# Patient Record
Sex: FEMALE | Race: WHITE | NOT HISPANIC OR LATINO | URBAN - METROPOLITAN AREA
[De-identification: names, ages, dates, MRNs, and addresses within clinical notes are randomized per-mention and may not be internally consistent; named-entity substitution may affect disease eponyms.]

---

## 2021-03-08 NOTE — PROGRESS NOTES
SUBJECTIVE:   CC: {Ivy Arriaga is an {30 year old woman who presents for preventive health visit. She is a new patient to our clinic and has been receiving health care at [facility].  Her last CPE was several years ago.. She has a history of intrinsic eczema. She has the following concerns she would like addressed today:    Long standing history of eczema: She primarily has outbreaks on her shins.  Rash has been present her whole life and father has similar.  She occasionally gets outbreaks on other parts of her body.  She has seen dermatology in the past and tried many steroid creams which may make it better for a short period of time.  Today she is wondering if it could be related to a food allergy specificly gluten.  Her symptoms do seem to be better when she sticks with a gluten-free diet.   Has improved with age.       GYN history:  Menarche: 12  Periods: regular, lasting 5-6days.  Flow described as heavy initially for 2 days.  Cycles are 35-40 days.  yes dysmenorrhea, yes Dysparuneia  Currently sexually active: yes    Contraception: Condoms--would really like to avoid hormonal birth control methods.  {Patient's last menstrual period was 2021.  Vaginal symptoms: No  Concern for STD: No    Accepts/requests STD testing: declined  History of abnormal Pap smear: NO - age 21-29 PAP every 3 years recommended    Health maintenance:  Mammogram: NA  Colonoscopy: NA  PAP: No results found for: PAP    Immunizations: Unable to look up patient in back due to name change with marriage in the last year.  Chart merge requested.    Immunization records are not available for review.  She is currently weight awaiting her second Covid vaccine.  Encouraged her to send full vaccine records when able we will review.  Suspect she may be due for a tetanus shot.    Healthy Habits:    Do you get at least three servings of calcium containing foods daily (dairy, green leafy vegetables, etc.)? yes    Amount of exercise  or daily activities, outside of work: 5-6 day(s) per week    Problems taking medications regularly not applicable    Medication side effects: No    Have you had an eye exam in the past two years? yes    Do you see a dentist twice per year? yes    Do you have sleep apnea, excessive snoring or daytime drowsiness?no    PHQ-2 Score:     PHQ-2 ( 1999 Pfizer) 3/9/2021   Q1: Little interest or pleasure in doing things 0   Q2: Feeling down, depressed or hopeless 0   PHQ-2 Score 0          Patient Active Problem List    Diagnosis Date Noted     Menorrhagia with regular cycle 03/19/2021     Priority: Medium     Intrinsic eczema 03/19/2021     Priority: Medium     Vitamin D deficiency 03/19/2021     Priority: Medium     Rapid resting heart rate 05/13/2019     Priority: Medium     HECTOR (generalized anxiety disorder) 12/11/2017     Priority: Medium       Past Medical History:   Diagnosis Date     Intrinsic eczema        Past Surgical History:   Procedure Laterality Date     wisdom teeth         Family History   Problem Relation Age of Onset     Diabetes Type 1 Father      Hypertension Maternal Grandmother      Parkinsonism Paternal Grandmother        Social History     Tobacco Use     Smoking status: Never Smoker     Smokeless tobacco: Never Used   Substance Use Topics     Alcohol use: Yes     Alcohol/week: 5.0 standard drinks     Types: 5 Standard drinks or equivalent per week     Frequency: 2-3 times a week     Drinks per session: 1 or 2     Binge frequency: Never       Social History     Social History Narrative    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  Grew up in [place].    Recently  2019.        Has a good support system.    Feels safe in all environments.    Wears seatbelt 100% of the time    Wears helmet while biking.    Denies history of abuse, past or present, physical, sexual or emotional.     Marsha Emery PA-C    03/09/21           No current outpatient medications on file.                          Reviewed  "orders with patient.  Reviewed health maintenance and updated orders accordingly - Yes    ROS:  CONSTITUTIONAL: NEGATIVE for fever, chills, change in weight  INTEGUMENTARU/SKIN: NEGATIVE for worrisome rashes, moles or lesions  EYES: NEGATIVE for vision changes or irritation  ENT: NEGATIVE for ear, mouth and throat problems  RESP: NEGATIVE for significant cough or SOB  BREAST: NEGATIVE for masses, tenderness or discharge  CV: NEGATIVE for chest pain, palpitations or peripheral edema  GI: NEGATIVE for nausea, abdominal pain, heartburn, or change in bowel habits  : NEGATIVE for unusual urinary or vaginal symptoms. Periods are regular.  MUSCULOSKELETAL: NEGATIVE for significant arthralgias or myalgia  NEURO: NEGATIVE for weakness, dizziness or paresthesias  ENDOCRINE: NEGATIVE for temperature intolerance, skin/hair changes  HEME/ALLERGY/IMMUNE: NEGATIVE for bleeding problems  PSYCHIATRIC: NEGATIVE for changes in mood or affect    OBJECTIVE:   /84   Pulse 106   Temp 97  F (36.1  C) (Skin)   Resp 15   Ht 1.644 m (5' 4.72\")   Wt 64.3 kg (141 lb 12 oz)   LMP 02/11/2021   SpO2 100%   BMI 23.79 kg/m      EXAM:  GENERAL: healthy, alert and no distress  EYES: Eyes grossly normal to inspection, PERRL and conjunctivae and sclerae normal  HENT: ear canals and TM's normal, nose and mouth without ulcers or lesions  NECK: no adenopathy, no asymmetry, masses, or scars and thyroid normal to palpation.  No bruits  RESP: lungs clear to auscultation - no rales, rhonchi or wheezes  BREAST: normal without masses, tenderness or nipple discharge and no palpable axillary masses or adenopathy  CV: regular rate and rhythm, normal S1 S2, no S3 or S4, no murmur, click or rub, no peripheral edema and peripheral pulses strong  ABDOMEN: soft, nontender, no hepatosplenomegaly, no masses and bowel sounds normal   (female): normal female external genitalia, normal urethral meatus, vaginal mucosa pink, moist, well rugated, and normal " cervix/adnexa/uterus without masses or discharge  MS: no gross musculoskeletal defects noted, no clubbing, edema or cyanosis of extremities.  Pulses = and appropriate bilaterally to DP and PT  SKIN: no suspicious lesions or rashes  NEURO: Normal strength and tone, mentation intact and speech normal  PSYCH: mentation appears normal, affect normal/bright  LYMPH: no cervical, supraclavicular, axillary, or inguinal adenopathy      ASSESSMENT/PLAN:   1. Routine general medical examination at a health care facility  - Hepatitis C Screen Reflex to HCV RNA Quant and Genotype    2. Menorrhagia with regular cycle  - Ferritin    3. Intrinsic eczema  - Allergen Food Gluten IgG  - CBC with Diff Plt ([employer])    4. Screening for cervical cancer  - Pap imaged thin layer screen reflex to HPV if ASCUS - recommend age 25 - 29    5. Screening for lipid disorders  - Lipid Panel ([employer])    6. Screen for STD (sexually transmitted disease)  - HIV Antigen Antibody Combo    7. Vitamin D deficiency  - Vitamin D Deficiency    COUNSELING:   Reviewed preventive health counseling, as reflected in patient instructions    BP Readings from Last 3 Encounters:   03/09/21 138/84      Body mass index is 23.79 kg/m .      BP Screening:   Last 3 BP Readings:    BP Readings from Last 3 Encounters:   03/09/21 138/84       The following was recommended to the patient:  Re-screen BP within a year and recommended lifestyle modifications      History   Smoking Status     Never Smoker   Smokeless Tobacco     Never Used            Counseling Resources:  ATP IV Guidelines  Pooled Cohorts Equation Calculator  Breast Cancer Risk Calculator  FRAX Risk Assessment  ICSI Preventive Guidelines  Dietary Guidelines for Americans, 2010  [employer]'s MyPlate  ASA Prophylaxis  Lung CA Screening    Kassidy Emery PA-C  [facility]

## 2021-03-08 NOTE — PATIENT INSTRUCTIONS
Please send me your home blood pressure by [facility].  Marsha Emery PA-C      Welcome to [facility], where we are committed to the art of inspired primary care.  Thank you for choosing us to be a part of your well-being.    The clinic is open Monday through Friday, 8:00 a.m. - 5:00 p.m., and Saturdays from 8:00 a.m. - 12:00 p.m.  After-hours questions are directed to our 24-hour nurse line, which can be reached by calling the clinic at [phone].  You can also contact the clinic through [facility], our online patient portal.  (Please allow 1-2 business days for a response via [facility].)  If you are not already enrolled in [facility], access instructions are below.    If you need a refill on your prescription, please contact the pharmacy where you filled it, and they will contact our clinic with the details of what is needed.  If your prescription is a controlled substance, you will have a conversation with your provider to determine if you would like to  your prescription at the clinic or have it mailed to your pharmacy.  Please allow 2-3 business days for all refill requests to be handled.    We look forward to providing you with great care!    Preventive Health Recommendations  Female Ages 26 - 39  Yearly exam:   See your health care provider every year in order to    Review health changes.     Discuss preventive care.      Review your medicines if you your doctor has prescribed any.    Until age 30: Get a Pap test every three years (more often if you have had an abnormal result).    After age 30: Talk to your doctor about whether you should have a Pap test every 3 years or have a Pap test with HPV screening every 5 years.   You do not need a Pap test if your uterus was removed (hysterectomy) and you have not had cancer.  You should be tested each year for STDs (sexually transmitted diseases), if you're at risk.   Talk to your provider about how often to have your cholesterol  checked.  If you are at risk for diabetes, you should have a diabetes test (fasting glucose).  Shots: Get a flu shot each year. Get a tetanus shot every 10 years.   Nutrition:     Eat at least 5 servings of fruits and vegetables each day.    Eat whole-grain bread, whole-wheat pasta and brown rice instead of white grains and rice.    Get adequate Calcium and Vitamin D.     Lifestyle    Exercise at least 150 minutes a week (30 minutes a day, 5 days of the week). This will help you control your weight and prevent disease.    Limit alcohol to one drink per day.    No smoking.     Wear sunscreen to prevent skin cancer.    See your dentist every six months for an exam and cleaning.

## 2021-03-09 ENCOUNTER — OFFICE VISIT (OUTPATIENT)
Dept: FAMILY MEDICINE | Facility: CLINIC | Age: 31
End: 2021-03-09
Payer: COMMERCIAL

## 2021-03-09 VITALS
HEART RATE: 106 BPM | HEIGHT: 65 IN | DIASTOLIC BLOOD PRESSURE: 84 MMHG | RESPIRATION RATE: 15 BRPM | BODY MASS INDEX: 23.62 KG/M2 | WEIGHT: 141.75 LBS | TEMPERATURE: 97 F | OXYGEN SATURATION: 100 % | SYSTOLIC BLOOD PRESSURE: 138 MMHG

## 2021-03-09 DIAGNOSIS — Z13.220 SCREENING FOR LIPID DISORDERS: ICD-10-CM

## 2021-03-09 DIAGNOSIS — Z11.3 SCREEN FOR STD (SEXUALLY TRANSMITTED DISEASE): ICD-10-CM

## 2021-03-09 DIAGNOSIS — L20.84 INTRINSIC ECZEMA: ICD-10-CM

## 2021-03-09 DIAGNOSIS — Z00.00 ROUTINE GENERAL MEDICAL EXAMINATION AT A HEALTH CARE FACILITY: Primary | ICD-10-CM

## 2021-03-09 DIAGNOSIS — Z12.4 SCREENING FOR CERVICAL CANCER: ICD-10-CM

## 2021-03-09 DIAGNOSIS — N92.0 MENORRHAGIA WITH REGULAR CYCLE: ICD-10-CM

## 2021-03-09 DIAGNOSIS — E55.9 VITAMIN D DEFICIENCY: ICD-10-CM

## 2021-03-09 LAB
% GRANULOCYTES: 65 %G (ref 40–75)
CHOLEST SERPL-MCNC: 204 MG/DL (ref 0–200)
CHOLEST/HDLC SERPL: 2.6 {RATIO} (ref 0–5)
DEPRECATED CALCIDIOL+CALCIFEROL SERPL-MC: 52 UG/L (ref 20–75)
ERYTHROCYTE [DISTWIDTH] IN BLOOD BY AUTOMATED COUNT: 12 %
FASTING SPECIMEN: YES
FERRITIN SERPL-MCNC: 31 NG/ML (ref 12–150)
GRANULOCYTES #: 2.9 K/UL (ref 1.6–8.3)
HCT VFR BLD AUTO: 43.3 % (ref 35–47)
HCV AB SERPL QL IA: NONREACTIVE
HDLC SERPL-MCNC: 79 MG/DL
HEMOGLOBIN: 14.5 G/DL (ref 11.7–15.7)
HIV 1+2 AB+HIV1 P24 AG SERPL QL IA: NONREACTIVE
LDLC SERPL CALC-MCNC: 113 MG/DL (ref 0–129)
LYMPHOCYTES # BLD AUTO: 1.2 K/UL (ref 0.8–5.3)
LYMPHOCYTES NFR BLD AUTO: 26 %L (ref 20–48)
MCH RBC QN AUTO: 31.8 PG (ref 26.5–35)
MCHC RBC AUTO-ENTMCNC: 33.5 G/DL (ref 32–36)
MCV RBC AUTO: 95 FL (ref 78–100)
MID #: 0.4 K/UL (ref 0–2.2)
MID %: 9 %M (ref 0–20)
PLATELET # BLD AUTO: 151 K/UL (ref 150–450)
RBC # BLD AUTO: 4.56 M/UL (ref 3.8–5.2)
TRIGL SERPL-MCNC: 62 MG/DL (ref 0–150)
VLDL-CHOLESTEROL: 12 (ref 7–32)
WBC # BLD AUTO: 4.5 K/UL (ref 4–11)

## 2021-03-09 SDOH — HEALTH STABILITY: MENTAL HEALTH: HOW MANY STANDARD DRINKS CONTAINING ALCOHOL DO YOU HAVE ON A TYPICAL DAY?: 1 OR 2

## 2021-03-09 SDOH — SOCIAL STABILITY: SOCIAL NETWORK: HOW OFTEN DO YOU ATTENT MEETINGS OF THE CLUB OR ORGANIZATION YOU BELONG TO?: NOT ASKED

## 2021-03-09 SDOH — SOCIAL STABILITY: SOCIAL INSECURITY: WITHIN THE LAST YEAR, HAVE YOU BEEN HUMILIATED OR EMOTIONALLY ABUSED IN OTHER WAYS BY YOUR PARTNER OR EX-PARTNER?: NO

## 2021-03-09 SDOH — SOCIAL STABILITY: SOCIAL INSECURITY
WITHIN THE LAST YEAR, HAVE YOU BEEN KICKED, HIT, SLAPPED, OR OTHERWISE PHYSICALLY HURT BY YOUR PARTNER OR EX-PARTNER?: NO

## 2021-03-09 SDOH — HEALTH STABILITY: MENTAL HEALTH
STRESS IS WHEN SOMEONE FEELS TENSE, NERVOUS, ANXIOUS, OR CAN'T SLEEP AT NIGHT BECAUSE THEIR MIND IS TROUBLED. HOW STRESSED ARE YOU?: ONLY A LITTLE

## 2021-03-09 SDOH — ECONOMIC STABILITY: FOOD INSECURITY: WITHIN THE PAST 12 MONTHS, THE FOOD YOU BOUGHT JUST DIDN'T LAST AND YOU DIDN'T HAVE MONEY TO GET MORE.: NEVER TRUE

## 2021-03-09 SDOH — SOCIAL STABILITY: SOCIAL INSECURITY
WITHIN THE LAST YEAR, HAVE TO BEEN RAPED OR FORCED TO HAVE ANY KIND OF SEXUAL ACTIVITY BY YOUR PARTNER OR EX-PARTNER?: NO

## 2021-03-09 SDOH — SOCIAL STABILITY: SOCIAL INSECURITY: WITHIN THE LAST YEAR, HAVE YOU BEEN AFRAID OF YOUR PARTNER OR EX-PARTNER?: NO

## 2021-03-09 SDOH — HEALTH STABILITY: MENTAL HEALTH: HOW OFTEN DO YOU HAVE 6 OR MORE DRINKS ON ONE OCCASION?: NEVER

## 2021-03-09 SDOH — SOCIAL STABILITY: SOCIAL NETWORK: IN A TYPICAL WEEK, HOW MANY TIMES DO YOU TALK ON THE PHONE WITH FAMILY, FRIENDS, OR NEIGHBORS?: NOT ASKED

## 2021-03-09 SDOH — HEALTH STABILITY: MENTAL HEALTH: HOW OFTEN DO YOU HAVE A DRINK CONTAINING ALCOHOL?: 2-3 TIMES A WEEK

## 2021-03-09 SDOH — ECONOMIC STABILITY: FOOD INSECURITY: WITHIN THE PAST 12 MONTHS, YOU WORRIED THAT YOUR FOOD WOULD RUN OUT BEFORE YOU GOT MONEY TO BUY MORE.: NEVER TRUE

## 2021-03-09 SDOH — SOCIAL STABILITY: SOCIAL NETWORK: HOW OFTEN DO YOU ATTEND CHURCH OR RELIGIOUS SERVICES?: NOT ASKED

## 2021-03-09 SDOH — SOCIAL STABILITY: SOCIAL NETWORK
DO YOU BELONG TO ANY CLUBS OR ORGANIZATIONS SUCH AS CHURCH GROUPS UNIONS, FRATERNAL OR ATHLETIC GROUPS, OR SCHOOL GROUPS?: NOT ASKED

## 2021-03-09 SDOH — HEALTH STABILITY: PHYSICAL HEALTH: ON AVERAGE, HOW MANY MINUTES DO YOU ENGAGE IN EXERCISE AT THIS LEVEL?: 60 MIN

## 2021-03-09 SDOH — ECONOMIC STABILITY: TRANSPORTATION INSECURITY
IN THE PAST 12 MONTHS, HAS THE LACK OF TRANSPORTATION KEPT YOU FROM MEDICAL APPOINTMENTS OR FROM GETTING MEDICATIONS?: NO

## 2021-03-09 SDOH — ECONOMIC STABILITY: TRANSPORTATION INSECURITY
IN THE PAST 12 MONTHS, HAS LACK OF TRANSPORTATION KEPT YOU FROM MEETINGS, WORK, OR FROM GETTING THINGS NEEDED FOR DAILY LIVING?: NO

## 2021-03-09 SDOH — SOCIAL STABILITY: SOCIAL NETWORK: HOW OFTEN DO YOU GET TOGETHER WITH FRIENDS OR RELATIVES?: NOT ASKED

## 2021-03-09 SDOH — ECONOMIC STABILITY: INCOME INSECURITY: HOW HARD IS IT FOR YOU TO PAY FOR THE VERY BASICS LIKE FOOD, HOUSING, MEDICAL CARE, AND HEATING?: NOT HARD AT ALL

## 2021-03-09 SDOH — SOCIAL STABILITY: SOCIAL NETWORK: ARE YOU MARRIED, WIDOWED, DIVORCED, SEPARATED, NEVER MARRIED, OR LIVING WITH A PARTNER?: NOT ASKED

## 2021-03-09 SDOH — HEALTH STABILITY: PHYSICAL HEALTH: ON AVERAGE, HOW MANY DAYS PER WEEK DO YOU ENGAGE IN MODERATE TO STRENUOUS EXERCISE (LIKE A BRISK WALK)?: 5 DAYS

## 2021-03-09 ASSESSMENT — MIFFLIN-ST. JEOR: SCORE: 1364.46

## 2021-03-09 NOTE — NURSING NOTE
"30 year old  Chief Complaint   Patient presents with     Physical     check labs     Derm Problem     talk about eczema       Blood pressure (!) 147/84, pulse 106, temperature 97  F (36.1  C), temperature source Skin, resp. rate 15, height 1.644 m (5' 4.72\"), weight 64.3 kg (141 lb 12 oz), last menstrual period 02/11/2021, SpO2 100 %. Body mass index is 23.79 kg/m .  There is no problem list on file for this patient.      Wt Readings from Last 2 Encounters:   03/09/21 64.3 kg (141 lb 12 oz)     BP Readings from Last 3 Encounters:   03/09/21 (!) 147/84         No current outpatient medications on file.     No current facility-administered medications for this visit.        Social History     Tobacco Use     Smoking status: Never Smoker     Smokeless tobacco: Never Used   Substance Use Topics     Alcohol use: Yes     Frequency: 2-3 times a week     Drinks per session: 3 or 4     Drug use: Never       There are no preventive care reminders to display for this patient.    No results found for: PAP      March 9, 2021 9:20 AM    "

## 2021-03-11 LAB
COPATH REPORT: NORMAL
GLUTEN IGG-MCNC: 3.16 MCG/ML
PAP: NORMAL

## 2021-03-19 PROBLEM — E55.9 VITAMIN D DEFICIENCY: Status: ACTIVE | Noted: 2021-03-19

## 2021-03-19 PROBLEM — N92.0 MENORRHAGIA WITH REGULAR CYCLE: Status: ACTIVE | Noted: 2021-03-19

## 2021-03-19 PROBLEM — L20.84 INTRINSIC ECZEMA: Status: ACTIVE | Noted: 2021-03-19

## 2021-03-19 PROBLEM — F41.1 GAD (GENERALIZED ANXIETY DISORDER): Status: ACTIVE | Noted: 2017-12-11

## 2021-03-19 PROBLEM — R00.0 RAPID RESTING HEART RATE: Status: ACTIVE | Noted: 2019-05-13

## 2021-05-27 ENCOUNTER — ALLIED HEALTH/NURSE VISIT (OUTPATIENT)
Dept: FAMILY MEDICINE | Facility: CLINIC | Age: 31
End: 2021-05-27
Payer: COMMERCIAL

## 2021-05-27 VITALS — TEMPERATURE: 97.1 F

## 2021-05-27 DIAGNOSIS — Z23 NEED FOR VACCINATION: Primary | ICD-10-CM

## 2021-05-27 NOTE — NURSING NOTE
Prior to immunization administration, verified patients identity using patient s name and date of birth. Please see Immunization Activity for additional information.     Screening Questionnaire for Adult Immunization    Are you sick today?   No   Do you have allergies to medications, food, a vaccine component or latex?   No   Have you ever had a serious reaction after receiving a vaccination?   No   Do you have a long-term health problem with heart, lung, kidney, or metabolic disease (e.g., diabetes), asthma, a blood disorder, no spleen, complement component deficiency, a cochlear implant, or a spinal fluid leak?  Are you on long-term aspirin therapy?   No   Do you have cancer, leukemia, HIV/AIDS, or any other immune system problem?   No   Do you have a parent, brother, or sister with an immune system problem?   No   In the past 3 months, have you taken medications that affect  your immune system, such as prednisone, other steroids, or anticancer drugs; drugs for the treatment of rheumatoid arthritis, Crohn s disease, or psoriasis; or have you had radiation treatments?   No   Have you had a seizure, or a brain or other nervous system problem?   No   During the past year, have you received a transfusion of blood or blood    products, or been given immune (gamma) globulin or antiviral drug?   No   For women: Are you pregnant or is there a chance you could become       pregnant during the next month?   No   Have you received any vaccinations in the past 4 weeks?   No     Immunization questionnaire answers were all negative.        Per orders of Kassidy Emery PA-C, injection of Tdap given by Carolina Gonzalez CMA. Patient instructed to remain in clinic for 15 minutes afterwards, and to report any adverse reaction to me immediately.       Screening performed by Carolina Gonzalez CMA on 5/27/2021 at 8:56 AM.

## 2021-10-11 ENCOUNTER — HEALTH MAINTENANCE LETTER (OUTPATIENT)
Age: 31
End: 2021-10-11

## 2022-04-14 ENCOUNTER — OFFICE VISIT (OUTPATIENT)
Dept: DERMATOLOGY | Facility: CLINIC | Age: 32
End: 2022-04-14
Payer: COMMERCIAL

## 2022-04-14 DIAGNOSIS — L30.8 OTHER ECZEMA: ICD-10-CM

## 2022-04-14 DIAGNOSIS — D22.5 BECKER'S NEVUS: ICD-10-CM

## 2022-04-14 DIAGNOSIS — D22.9 MULTIPLE BENIGN NEVI: ICD-10-CM

## 2022-04-14 DIAGNOSIS — L72.11 PILAR CYST: Primary | ICD-10-CM

## 2022-04-14 PROCEDURE — 99203 OFFICE O/P NEW LOW 30 MIN: CPT | Mod: GC | Performed by: DERMATOLOGY

## 2022-04-14 ASSESSMENT — PAIN SCALES - GENERAL: PAINLEVEL: NO PAIN (0)

## 2022-04-14 NOTE — NURSING NOTE
Dermatology Rooming Note    Ivy Arriaga's goals for this visit include:   Chief Complaint   Patient presents with     Skin Check     I have some moles that I want looked at and eczema on my legs.     Cynthia Cristina, BRANDYNA

## 2022-04-14 NOTE — PROGRESS NOTES
[facility] Dermatology Note  Encounter Date: Apr 14, 2022  Office Visit     Dermatology Problem List:  1. Pilar cyst, scalp x2  - referral to derm surg for excision  2. Eczematous dermatitis  - bleach baths  3. Haider's nevus, L upper chest  ____________________________________________    Assessment & Plan:     # Multiple clinically benign nevi  - ABCDEs: Counseled ABCDEs of melanoma: Asymmetry, Border (irregularity), Color (not uniform, changes in color), Diameter (greater than 6 mm which is about the size of a pencil eraser), and Evolving (any changes in preexisting moles).  - NTD: No further management at this time.  - Sun protection: Counseled SPF30+ sunscreen, UPF clothing, sun avoidance, tanning bed avoidance.     # Pilar cyst, scalp x2  - Benign etiology discussed with pt. Pt wishes to pursue surgical excision of the cysts. Discussed with pt that we are not currently excising benign lesions, but will add her to the list when we are available to accommodate benign lesions    # Eczematous dermatitis, predominantly on b/l lower extremities   - Recommended gentle skin care and application of moisturizer after bath/shower  - Recommended starting bleach baths 2-3x per week for 10-15 minutes    # Haider's nevus, L upper chest superior to breast  - Benign etiology discussed with pt. No further treatment necessary     Procedures Performed:   None    Follow-up: prn for new or changing lesions    Staff and Resident:     Marilee Mensah MD  PGY-4 Dermatology Resident     I have seen and examined this patient and agree with the assessment and plan as documented in the resident's note.    Ranjeet Lauren MD  Dermatology Attending    ____________________________________________    CC: Skin Check (I have some moles that I want looked at and eczema on my legs.)    HPI:  Ms. Ivy Arriaga is a(n) 31 year old female who presents today as a new patient for evaluation of her moles and eczema. She states that  she has had eczema since her youth that she is currently treating with OTC eczema moisturizers. The eczema often flares during the winter and improves in the summer.     Aside from this, she has a few moles on her chest, back and groin that she would like to have evaluated today. She has noticed one new mole on her R groin that has remained stable in size.   Patient is otherwise feeling well, without additional skin concerns.  She denies any personal or family history of skin cancer.      Labs Reviewed:  N/A    Physical Exam:  Vitals: There were no vitals taken for this visit.  SKIN: Full skin, which includes the head/face, both arms, chest, back, abdomen,both legs, genitalia and/or groin buttocks, digits and/or nails, was examined.  - There are 2 raised dome shaped 1 cm nodules present on the L vertex and temporal scalp    - There are ill-defined pink scaly patches and plaques on the b/l lower extremities.  - On the L upper chest, there is a large light brown 6 cm patch   - Multiple regular brown pigmented macules and papules are identified on the chest and back  - On the R inguinal fold, there is a 1.0 cm pink, verrucous papule with cobblestone appearance.   - No other lesions of concern on areas examined.     Medications:  No current outpatient medications on file.     No current facility-administered medications for this visit.      Past Medical History:   Patient Active Problem List   Diagnosis     Menorrhagia with regular cycle     Intrinsic eczema     Vitamin D deficiency     HECTOR (generalized anxiety disorder)     Rapid resting heart rate     Past Medical History:   Diagnosis Date     Intrinsic eczema        CC Referred Self, MD  No address on file on close of this encounter.

## 2022-04-14 NOTE — PATIENT INSTRUCTIONS
Dermatology Bleach Bath instructions    Type: Regular bleach used for clothing    For children:    1/4 cup concentrated bleach  or 1/2 cup plain bleach for a full tub 2- 3 times weekly      Soak for 10-15 minutes. After the bleach baths, apply a moisturizer such as vanicream, CeraVe, etc.      Dry Skin    What is dry skin?  Common skin problem  Can be worse during the winter   Affects all ages  Occurs in people with or without other skin problems    What does it look like?  Fine lines in the skin become more visible   Rough feeling skin   Flaky skin  Most common on the arms and legs  Skin can become cracked, especially on the hands and feet    What are some problems caused by dry skin?   Itching  Rubbing or scratching can cause thickened, rough skin patches  Cracks in skin can be painful  Red, itchy, scaly skin (called eczema) can occur  Yellow crusting or pus could be signs of an infection    What causes dry skin?  A lack of water in the top layer of the skin  Too much soapy water,  hot water, or harsh chemicals  Aging and sun damage    How do I treat dry skin?  Shower or bathe daily for under ten minutes with lukewarm water and mild soap.  Pat yourself dry with a towel gently and leave your skin slightly damp.  Use moisturizing cream or ointment right away.  Avoid lotions.    What kind of mild soap should I be using?  Camay , Dove , Tone , Neutrogena , Purpose , or Oil of Olay   A non-detergent cleanser, like Cetaphil , can be used.    What should I stay away from?  Scented soaps   Bath oils    What moisturizers should I be using?  Cetaphil Cream,CeraVe Cream, Vanicream, Aquaphilic, Eucerin, Aquaphor, or Vaseline   Always apply after showering or bathing.  Reapply throughout the day, if possible.  If dry skin affects your hands, always reapply after handwashing.    What else should I know?  Using a humidifier during winter months may help.  If dry skin gets worse or if eczema develops, a steroid cream may be  needed.

## 2022-04-14 NOTE — LETTER
4/14/2022       RE: Ivy Arriaga  [address]  [place] [address]     Dear Colleague,    Thank you for referring your patient, Ivy Arriaga, to the [facility] DERMATOLOGY [facility] [place] [facility]. Please see a copy of my visit note below.    [facility] Dermatology Note  Encounter Date: Apr 14, 2022  Office Visit     Dermatology Problem List:  1. Pilar cyst, scalp x2  - referral to derm surg for excision  2. Eczematous dermatitis  - bleach baths  3. Haider's nevus, L upper chest  ____________________________________________    Assessment & Plan:     # Multiple clinically benign nevi  - ABCDEs: Counseled ABCDEs of melanoma: Asymmetry, Border (irregularity), Color (not uniform, changes in color), Diameter (greater than 6 mm which is about the size of a pencil eraser), and Evolving (any changes in preexisting moles).  - NTD: No further management at this time.  - Sun protection: Counseled SPF30+ sunscreen, UPF clothing, sun avoidance, tanning bed avoidance.     # Pilar cyst, scalp x2  - Benign etiology discussed with pt. Pt wishes to pursue surgical excision of the cysts. Discussed with pt that we are not currently excising benign lesions, but will add her to the list when we are available to accommodate benign lesions    # Eczematous dermatitis, predominantly on b/l lower extremities   - Recommended gentle skin care and application of moisturizer after bath/shower  - Recommended starting bleach baths 2-3x per week for 10-15 minutes    # Haider's nevus, L upper chest superior to breast  - Benign etiology discussed with pt. No further treatment necessary     Procedures Performed:   None    Follow-up: prn for new or changing lesions    Staff and Resident:     Marilee Mensah MD  PGY-4 Dermatology Resident     I have seen and examined this patient and agree with the assessment and plan as documented in the resident's note.    Ranjeet  MD Leonidas  Dermatology Attending    ____________________________________________    CC: Skin Check (I have some moles that I want looked at and eczema on my legs.)    HPI:  Ms. Ivy Arriaga is a(n) 31 year old female who presents today as a new patient for evaluation of her moles and eczema. She states that she has had eczema since her youth that she is currently treating with OTC eczema moisturizers. The eczema often flares during the winter and improves in the summer.     Aside from this, she has a few moles on her chest, back and groin that she would like to have evaluated today. She has noticed one new mole on her R groin that has remained stable in size.   Patient is otherwise feeling well, without additional skin concerns.  She denies any personal or family history of skin cancer.      Labs Reviewed:  N/A    Physical Exam:  Vitals: There were no vitals taken for this visit.  SKIN: Full skin, which includes the head/face, both arms, chest, back, abdomen,both legs, genitalia and/or groin buttocks, digits and/or nails, was examined.  - There are 2 raised dome shaped 1 cm nodules present on the L vertex and temporal scalp    - There are ill-defined pink scaly patches and plaques on the b/l lower extremities.  - On the L upper chest, there is a large light brown 6 cm patch   - Multiple regular brown pigmented macules and papules are identified on the chest and back  - On the R inguinal fold, there is a 1.0 cm pink, verrucous papule with cobblestone appearance.   - No other lesions of concern on areas examined.     Medications:  No current outpatient medications on file.     No current facility-administered medications for this visit.      Past Medical History:   Patient Active Problem List   Diagnosis     Menorrhagia with regular cycle     Intrinsic eczema     Vitamin D deficiency     HECTOR (generalized anxiety disorder)     Rapid resting heart rate     Past Medical History:   Diagnosis Date     Intrinsic eczema         CC Referred Self, MD  No address on file on close of this encounter.

## 2022-05-09 PROBLEM — L72.11 PILAR CYST: Status: ACTIVE | Noted: 2022-05-09

## 2022-05-09 PROBLEM — D22.9 MULTIPLE BENIGN NEVI: Status: ACTIVE | Noted: 2022-05-09

## 2022-05-09 PROBLEM — L30.8 OTHER ECZEMA: Status: ACTIVE | Noted: 2022-05-09

## 2022-05-22 ENCOUNTER — HEALTH MAINTENANCE LETTER (OUTPATIENT)
Age: 32
End: 2022-05-22

## 2022-09-25 ENCOUNTER — HEALTH MAINTENANCE LETTER (OUTPATIENT)
Age: 32
End: 2022-09-25

## 2023-04-07 ENCOUNTER — OFFICE VISIT (OUTPATIENT)
Dept: MIDWIFE SERVICES | Facility: CLINIC | Age: 33
End: 2023-04-07
Payer: COMMERCIAL

## 2023-04-07 VITALS
BODY MASS INDEX: 24.72 KG/M2 | HEIGHT: 65 IN | DIASTOLIC BLOOD PRESSURE: 89 MMHG | SYSTOLIC BLOOD PRESSURE: 145 MMHG | HEART RATE: 96 BPM | WEIGHT: 148.4 LBS

## 2023-04-07 DIAGNOSIS — Z13.220 SCREENING FOR HYPERLIPIDEMIA: ICD-10-CM

## 2023-04-07 DIAGNOSIS — Z01.419 ENCOUNTER FOR ANNUAL ROUTINE GYNECOLOGICAL EXAMINATION: Primary | ICD-10-CM

## 2023-04-07 DIAGNOSIS — Z13.1 SCREENING FOR DIABETES MELLITUS: ICD-10-CM

## 2023-04-07 DIAGNOSIS — Z13.21 ENCOUNTER FOR VITAMIN DEFICIENCY SCREENING: ICD-10-CM

## 2023-04-07 DIAGNOSIS — Z13.0 SCREENING FOR IRON DEFICIENCY ANEMIA: ICD-10-CM

## 2023-04-07 LAB
CHOLEST SERPL-MCNC: 227 MG/DL
DEPRECATED CALCIDIOL+CALCIFEROL SERPL-MC: 74 UG/L (ref 20–75)
ERYTHROCYTE [DISTWIDTH] IN BLOOD BY AUTOMATED COUNT: 11.8 % (ref 10–15)
HBA1C MFR BLD: 5.1 % (ref 0–5.6)
HCT VFR BLD AUTO: 42.4 % (ref 35–47)
HDLC SERPL-MCNC: 82 MG/DL
HGB BLD-MCNC: 14.3 G/DL (ref 11.7–15.7)
LDLC SERPL CALC-MCNC: 133 MG/DL
MCH RBC QN AUTO: 32.5 PG (ref 26.5–33)
MCHC RBC AUTO-ENTMCNC: 33.7 G/DL (ref 31.5–36.5)
MCV RBC AUTO: 96 FL (ref 78–100)
NONHDLC SERPL-MCNC: 145 MG/DL
PLATELET # BLD AUTO: 207 10E3/UL (ref 150–450)
RBC # BLD AUTO: 4.4 10E6/UL (ref 3.8–5.2)
TRIGL SERPL-MCNC: 62 MG/DL
WBC # BLD AUTO: 4.4 10E3/UL (ref 4–11)

## 2023-04-07 PROCEDURE — 83036 HEMOGLOBIN GLYCOSYLATED A1C: CPT | Performed by: ADVANCED PRACTICE MIDWIFE

## 2023-04-07 PROCEDURE — 36415 COLL VENOUS BLD VENIPUNCTURE: CPT | Performed by: ADVANCED PRACTICE MIDWIFE

## 2023-04-07 PROCEDURE — 82306 VITAMIN D 25 HYDROXY: CPT | Performed by: ADVANCED PRACTICE MIDWIFE

## 2023-04-07 PROCEDURE — 99385 PREV VISIT NEW AGE 18-39: CPT | Performed by: ADVANCED PRACTICE MIDWIFE

## 2023-04-07 PROCEDURE — 85027 COMPLETE CBC AUTOMATED: CPT | Performed by: ADVANCED PRACTICE MIDWIFE

## 2023-04-07 PROCEDURE — 80061 LIPID PANEL: CPT | Performed by: ADVANCED PRACTICE MIDWIFE

## 2023-04-07 ASSESSMENT — PATIENT HEALTH QUESTIONNAIRE - PHQ9: SUM OF ALL RESPONSES TO PHQ QUESTIONS 1-9: 8

## 2023-04-07 NOTE — PROGRESS NOTES
Ivy is a 32 year old  female who presents for annual exam.     Menses are regular q 28-30 days and heavy lasting 5 days.  Menses flow: normal and heavy.  Patient's last menstrual period was 2023 (exact date).. Using none for contraception.  She is currently considering pregnancy.  Besides routine health maintenance,  she would like to discuss wanting to get pregnant next year.  GYNECOLOGIC HISTORY:  Menarche: 12  Age at first intercourse: 28 Number of lifetime partners: <5  Ivy is sexually active with male partner(s) and is currently in monogamous relationship.    History sexually transmitted infections:No STD history  STI testing offered?  Declined  MARK exposure: Unknown  History of abnormal Pap smear: NO - age 30-65 PAP every 5 years with negative HPV co-testing recommended  Family history of breast CA: No  Family history of uterine/ovarian CA: No    Family history of colon CA: No    HEALTH MAINTENANCE:  Cholesterol:   Cholesterol   Date Value Ref Range Status   2021 204.0 (H) 0.0 - 200.0 Final    History of abnormal lipids: No  Mammo: NA . History of abnormal Mammo: Not applicable.  Regular Self Breast Exams: No  Calcium/Vitamin D intake: source:  dairy Adequate? Yes    Lab Results   Component Value Date    PAP NIL 2021       HISTORY:  OB History    Para Term  AB Living   0 0 0 0 0 0   SAB IAB Ectopic Multiple Live Births   0 0 0 0 0     Past Medical History:   Diagnosis Date     Intrinsic eczema      Past Surgical History:   Procedure Laterality Date     wisdom teeth       Family History   Problem Relation Age of Onset     Fibroids Mother         hysterectomy     Diabetes Type 1 Father      Endometriosis Sister      Hypertension Maternal Grandmother      Parkinsonism Paternal Grandmother      Social History     Socioeconomic History     Marital status:    Occupational History     Occupation: Teacher Pre-K   Tobacco Use     Smoking status: Never     Smokeless  tobacco: Never   Substance and Sexual Activity     Alcohol use: Yes     Alcohol/week: 5.0 standard drinks of alcohol     Types: 5 Standard drinks or equivalent per week     Drug use: Never     Sexual activity: Yes     Partners: Male     Birth control/protection: Condom   Social History Narrative    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  Grew up in [place].    Recently  2019.        Has a good support system.    Feels safe in all environments.    Wears seatbelt 100% of the time    Wears helmet while biking.    Denies history of abuse, past or present, physical, sexual or emotional.     Marsha Emery PA-C    03/09/21         Social Determinants of Health     Financial Resource Strain: Low Risk  (3/9/2021)    Overall Financial Resource Strain (CARDIA)      Difficulty of Paying Living Expenses: Not hard at all   Food Insecurity: No Food Insecurity (3/9/2021)    Hunger Vital Sign      Worried About Running Out of Food in the Last Year: Never true      Ran Out of Food in the Last Year: Never true   Transportation Needs: No Transportation Needs (3/9/2021)    PRAPARE - Transportation      Lack of Transportation (Medical): No      Lack of Transportation (Non-Medical): No   Physical Activity: Sufficiently Active (3/9/2021)    Exercise Vital Sign      Days of Exercise per Week: 5 days      Minutes of Exercise per Session: 60 min   Stress: No Stress Concern Present (3/9/2021)    [employer] [employer] - Occupational Stress Questionnaire      Feeling of Stress : Only a little   Social Connections: Unknown (3/9/2021)    Social Connection and Isolation Panel [NHANES]      Frequency of Communication with Friends and Family: Not asked      Frequency of Social Gatherings with Friends and Family: Not asked      Attends Jewish Services: Not asked      Active Member of Clubs or Organizations: Not asked      Attends Club or Organization Meetings: Not asked      Marital Status: Not asked   Intimate Partner  "Violence: Not At Risk (3/9/2021)    Humiliation, Afraid, Rape, and Kick questionnaire      Fear of Current or Ex-Partner: No      Emotionally Abused: No      Physically Abused: No      Sexually Abused: No       Current Outpatient Medications:      VITAMIN D PO, , Disp: , Rfl:      Allergies   Allergen Reactions     Penicillins Hives       Past medical, surgical, social and family history were reviewed and updated in EPIC.    ROS:   C:     NEGATIVE for fever, chills, change in weight  I:       NEGATIVE for worrisome rashes, moles or lesions  E:     NEGATIVE for vision changes or irritation  E/M: NEGATIVE for ear, mouth and throat problems  R:     NEGATIVE for significant cough or SOB  CV:   NEGATIVE for chest pain, palpitations or peripheral edema  GI:     NEGATIVE for nausea, abdominal pain, heartburn, or change in bowel habits  :   NEGATIVE for frequency, dysuria, hematuria, vaginal discharge, or irregular bleeding  M:     NEGATIVE for significant arthralgias or myalgia  N:      NEGATIVE for weakness, dizziness or paresthesias  E:      NEGATIVE for temperature intolerance, skin/hair changes  P:      NEGATIVE for changes in mood or affect.    EXAM:  BP (!) 145/89   Pulse 96   Ht 1.638 m (5' 4.5\")   Wt 67.3 kg (148 lb 6.4 oz)   LMP 04/01/2023 (Exact Date)   BMI 25.08 kg/m     BMI: Body mass index is 25.08 kg/m .  Constitutional: healthy, alert and no distress  Head: Normocephalic. No masses, lesions, tenderness or abnormalities  Neck: Neck supple. Trachea midline. No adenopathy. Thyroid symmetric, normal size.   Cardiovascular: RRR.   Respiratory: Negative.   Breast: Breasts reveal mild symmetric fibrocystic densities, but there are no dominant, discrete, fixed or suspicious masses found.  Gastrointestinal: Abdomen soft, non-tender, non-distended. No masses, organomegaly.  Musculoskeletal: extremities normal  Skin: no suspicious lesions or rashes  Psychiatric: Affect appropriate, cooperative,mentation appears " normal.     COUNSELING:   Preventive Checklist   Some or all of the following items were discussed with the patient today if appropriate for their age/sex/medical problems/family history. See  or Plan for action taken.  Pap - Due in 2024 (with plans for pregnancy in near future would likely opt to do postpartum)  Mammogram - na  SBE's were discussed   Bone density,calcium intake, exercise,vitamin D and sunlight were discussed   Colonoscopy - na  Screening labs: routine preventative labs as ordered     reports that she has never smoked. She has never used smokeless tobacco.    Body mass index is 25.08 kg/m .    ASSESSMENT/PLAN:  32 year old female with satisfactory annual exam  (Z01.419) Encounter for annual routine gynecological examination  (primary encounter diagnosis)  Comment:   Plan:     (Z13.0) Screening for iron deficiency anemia  Comment:   Plan: CBC with platelets          (Z13.220) Screening for hyperlipidemia  Comment:   Plan: Lipid Profile (Chol, Trig, HDL, LDL calc)          (Z13.21) Encounter for vitamin deficiency screening  Comment:   Plan: Vitamin D Deficiency          (Z13.1) Screening for diabetes mellitus  Comment:   Plan: Hemoglobin A1c     Encouraged to start prenatal vitamin now, has been tracking cycle and not currently using anything for contraception. Is aware of ovulation and timing of intercourse to achieve pregnancy.        Patient reports white coat syndrome with initial reading in clinic setting persistently elevated.  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 We discussed this in the setting of pregnancy and encouraged her to request manual blood pressures after sitting for 10 minutes to ensure accurate readings.  Will notify patient of lab results via [facility] [employer]  RTC yearly for annual exam or sooner saundra Wallace CNM

## 2024-03-19 ENCOUNTER — OFFICE VISIT (OUTPATIENT)
Dept: MIDWIFE SERVICES | Facility: CLINIC | Age: 34
End: 2024-03-19
Payer: COMMERCIAL

## 2024-03-19 VITALS
HEART RATE: 81 BPM | WEIGHT: 150.4 LBS | SYSTOLIC BLOOD PRESSURE: 129 MMHG | DIASTOLIC BLOOD PRESSURE: 76 MMHG | HEIGHT: 64 IN | TEMPERATURE: 98.1 F | BODY MASS INDEX: 25.68 KG/M2

## 2024-03-19 DIAGNOSIS — F41.1 GENERALIZED ANXIETY DISORDER: ICD-10-CM

## 2024-03-19 DIAGNOSIS — Z31.69 ENCOUNTER FOR PRECONCEPTION CONSULTATION: ICD-10-CM

## 2024-03-19 DIAGNOSIS — Z01.419 ENCOUNTER FOR GYNECOLOGICAL EXAMINATION WITHOUT ABNORMAL FINDING: Primary | ICD-10-CM

## 2024-03-19 DIAGNOSIS — Z13.29 SCREENING FOR ENDOCRINE, METABOLIC AND IMMUNITY DISORDER: ICD-10-CM

## 2024-03-19 DIAGNOSIS — Z13.0 SCREENING FOR ENDOCRINE, METABOLIC AND IMMUNITY DISORDER: ICD-10-CM

## 2024-03-19 DIAGNOSIS — Z13.228 SCREENING FOR ENDOCRINE, METABOLIC AND IMMUNITY DISORDER: ICD-10-CM

## 2024-03-19 LAB
ERYTHROCYTE [DISTWIDTH] IN BLOOD BY AUTOMATED COUNT: 11.6 % (ref 10–15)
HCT VFR BLD AUTO: 43.5 % (ref 35–47)
HGB BLD-MCNC: 14.6 G/DL (ref 11.7–15.7)
MCH RBC QN AUTO: 32.4 PG (ref 26.5–33)
MCHC RBC AUTO-ENTMCNC: 33.6 G/DL (ref 31.5–36.5)
MCV RBC AUTO: 97 FL (ref 78–100)
PLATELET # BLD AUTO: 178 10E3/UL (ref 150–450)
RBC # BLD AUTO: 4.51 10E6/UL (ref 3.8–5.2)
WBC # BLD AUTO: 5.9 10E3/UL (ref 4–11)

## 2024-03-19 PROCEDURE — 85027 COMPLETE CBC AUTOMATED: CPT | Performed by: ADVANCED PRACTICE MIDWIFE

## 2024-03-19 PROCEDURE — 99395 PREV VISIT EST AGE 18-39: CPT | Performed by: ADVANCED PRACTICE MIDWIFE

## 2024-03-19 PROCEDURE — 82947 ASSAY GLUCOSE BLOOD QUANT: CPT | Performed by: ADVANCED PRACTICE MIDWIFE

## 2024-03-19 PROCEDURE — 99213 OFFICE O/P EST LOW 20 MIN: CPT | Mod: 25 | Performed by: ADVANCED PRACTICE MIDWIFE

## 2024-03-19 PROCEDURE — 80061 LIPID PANEL: CPT | Performed by: ADVANCED PRACTICE MIDWIFE

## 2024-03-19 PROCEDURE — 84443 ASSAY THYROID STIM HORMONE: CPT | Performed by: ADVANCED PRACTICE MIDWIFE

## 2024-03-19 PROCEDURE — 36415 COLL VENOUS BLD VENIPUNCTURE: CPT | Performed by: ADVANCED PRACTICE MIDWIFE

## 2024-03-19 ASSESSMENT — ANXIETY QUESTIONNAIRES
GAD7 TOTAL SCORE: 13
7. FEELING AFRAID AS IF SOMETHING AWFUL MIGHT HAPPEN: MORE THAN HALF THE DAYS
1. FEELING NERVOUS, ANXIOUS, OR ON EDGE: NEARLY EVERY DAY
IF YOU CHECKED OFF ANY PROBLEMS ON THIS QUESTIONNAIRE, HOW DIFFICULT HAVE THESE PROBLEMS MADE IT FOR YOU TO DO YOUR WORK, TAKE CARE OF THINGS AT HOME, OR GET ALONG WITH OTHER PEOPLE: SOMEWHAT DIFFICULT
6. BECOMING EASILY ANNOYED OR IRRITABLE: SEVERAL DAYS
5. BEING SO RESTLESS THAT IT IS HARD TO SIT STILL: SEVERAL DAYS
GAD7 TOTAL SCORE: 13
2. NOT BEING ABLE TO STOP OR CONTROL WORRYING: MORE THAN HALF THE DAYS
3. WORRYING TOO MUCH ABOUT DIFFERENT THINGS: MORE THAN HALF THE DAYS

## 2024-03-19 ASSESSMENT — PATIENT HEALTH QUESTIONNAIRE - PHQ9
5. POOR APPETITE OR OVEREATING: MORE THAN HALF THE DAYS
SUM OF ALL RESPONSES TO PHQ QUESTIONS 1-9: 9

## 2024-03-19 NOTE — PROGRESS NOTES
"Ivy is a 33 year old  female who presents for annual exam.     Menses are regular q 28-30 days and crampy/emotional lasting 5 days.  Menses flow: normal, medium, and heavy.  Patient's last menstrual period was 2024 (approximate).. Using none for contraception.  She is currently considering pregnancy.  Besides routine health maintenance,  she would like to discuss anxiety/depression. Reports she does have a history of anxiety but has never been on medication. She has noticed an increase in symptoms of anxiety/depression in the past few months and is considering medication.  She sees a therapist which is helpful. She is curious about options for medications given she is hoping to become pregnant. She has been timing intercourse with fertile window since August, approximately 7 mo.   GYNECOLOGIC HISTORY:  Menarche: 13  Age at first intercourse: 27 Number of lifetime partners: 1  Ivy is sexually active with 1 male partner(s) and is currently in monogamous relationship with .    History sexually transmitted infections:No STD history  STI testing offered?  Declined  MARK exposure: No  History of abnormal Pap smear: NO - age 30-65 PAP every 5 years with negative HPV co-testing recommended  Family history of breast CA: No  Family history of uterine/ovarian CA: No    Family history of colon CA: No    HEALTH MAINTENANCE:  Cholesterol: (  Cholesterol   Date Value Ref Range Status   2023 227 (H) <200 mg/dL Final   2021 204.0 (H) 0.0 - 200.0 Final    History of abnormal lipids: Yes  Mammo: NA . History of abnormal Mammo: Not applicable.  Regular Self Breast Exams: No  Calcium/Vitamin D intake: source:  dairy, dietary supplement(s) Adequate? Yes  TSH: (No results found for: \"TSH\" )  Pap; (  Lab Results   Component Value Date    PAP NIL 2021    )    HISTORY:  OB History    Para Term  AB Living   0 0 0 0 0 0   SAB IAB Ectopic Multiple Live Births   0 0 0 0 0     Past " Medical History:   Diagnosis Date    Intrinsic eczema      Past Surgical History:   Procedure Laterality Date    wisdom teeth       Family History   Problem Relation Age of Onset    Fibroids Mother         hysterectomy    Diabetes Type 1 Father     Endometriosis Sister     Hypertension Maternal Grandmother     Parkinsonism Paternal Grandmother      Social History     Socioeconomic History    Marital status:      Spouse name: None    Number of children: None    Years of education: None    Highest education level: None   Occupational History    Occupation: Teacher Pre-K   Tobacco Use    Smoking status: Never    Smokeless tobacco: Never   Substance and Sexual Activity    Alcohol use: Yes     Alcohol/week: 5.0 standard drinks of alcohol     Types: 5 Standard drinks or equivalent per week    Drug use: Never    Sexual activity: Yes     Partners: Male   Social History Narrative    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  Grew up in [place].    Recently  2019.        Has a good support system.    Feels safe in all environments.    Wears seatbelt 100% of the time    Wears helmet while biking.    Denies history of abuse, past or present, physical, sexual or emotional.     Marsha Emery PA-C    03/09/21         Social Determinants of Health     Financial Resource Strain: Low Risk  (3/9/2021)    Overall Financial Resource Strain (CARDIA)     Difficulty of Paying Living Expenses: Not hard at all   Food Insecurity: No Food Insecurity (3/9/2021)    Hunger Vital Sign     Worried About Running Out of Food in the Last Year: Never true     Ran Out of Food in the Last Year: Never true   Transportation Needs: No Transportation Needs (3/9/2021)    PRAPARE - Transportation     Lack of Transportation (Medical): No     Lack of Transportation (Non-Medical): No   Physical Activity: Sufficiently Active (3/9/2021)    Exercise Vital Sign     Days of Exercise per Week: 5 days     Minutes of Exercise per Session: 60 min   Stress: No  "Stress Concern Present (3/9/2021)    [employer] [place] [employer] - Occupational Stress Questionnaire     Feeling of Stress : Only a little   Social Connections: Unknown (3/9/2021)    Social Connection and Isolation Panel [NHANES]     Frequency of Communication with Friends and Family: Not asked     Frequency of Social Gatherings with Friends and Family: Not asked     Attends Restorationist Services: Not asked     Active Member of Clubs or Organizations: Not asked     Attends Club or Organization Meetings: Not asked     Marital Status: Not asked   Interpersonal Safety: Not At Risk (3/9/2021)    Humiliation, Afraid, Rape, and Kick questionnaire     Fear of Current or Ex-Partner: No     Emotionally Abused: No     Physically Abused: No     Sexually Abused: No       Current Outpatient Medications:     Prenatal Vit-Fe Fumarate-FA (PRENATAL MULTIVITAMIN  PLUS IRON) 27-1 MG TABS, Take by mouth daily, Disp: , Rfl:     VITAMIN D PO, , Disp: , Rfl:      Allergies   Allergen Reactions    Penicillins Hives       Past medical, surgical, social and family history were reviewed and updated in EPIC.    ROS:   C:     NEGATIVE for fever, chills, change in weight  I:       NEGATIVE for worrisome rashes, moles or lesions  E:     NEGATIVE for vision changes or irritation  E/M: NEGATIVE for ear, mouth and throat problems  R:     NEGATIVE for significant cough or SOB  CV:   NEGATIVE for chest pain, palpitations or peripheral edema  GI:     NEGATIVE for nausea, abdominal pain, heartburn, or change in bowel habits  :   NEGATIVE for frequency, dysuria, hematuria, vaginal discharge, or irregular bleeding  M:     NEGATIVE for significant arthralgias or myalgia  N:      NEGATIVE for weakness, dizziness or paresthesias  E:      NEGATIVE for temperature intolerance, skin/hair changes  P:      positive for changes in mood or affect.    EXAM:  /76   Pulse 81   Temp 98.1  F (36.7  C)   Ht 1.626 m (5' 4\")   Wt 68.2 kg (150 lb 6.4 " oz)   LMP 02/25/2024 (Approximate)   BMI 25.82 kg/m     BMI: Body mass index is 25.82 kg/m .  Constitutional: healthy, alert and no distress  Head: Normocephalic. No masses, lesions, tenderness or abnormalities  Neck: Neck supple. Trachea midline. No adenopathy. Thyroid symmetric, normal size.   Cardiovascular: RRR.   Respiratory: Negative.   Breast: Breasts reveal mild symmetric fibrocystic densities, but there are no dominant, discrete, fixed or suspicious masses found.  Gastrointestinal: Abdomen soft, non-tender, non-distended. No masses, organomegaly.  : deferred  Musculoskeletal: extremities normal  Skin: no suspicious lesions or rashes  Psychiatric: Affect appropriate, cooperative,mentation appears normal.     COUNSELING:   Reviewed preventive health counseling, as reflected in patient instructions       Family planning   reports that she has never smoked. She has never used smokeless tobacco.    Body mass index is 25.82 kg/m .    FRAX Risk Assessment    ASSESSMENT/PLAN:  33 year old female with satisfactory annual exam    (Z01.419) Encounter for gynecological examination without abnormal finding  (primary encounter diagnosis)      (Z13.29,  Z13.228,  Z13.0) Screening for endocrine, metabolic and immunity disorder  Plan: CBC with platelets, Glucose, TSH with free T4         reflex, Lipid panel reflex to direct LDL         Fasting      (Z31.69) Encounter for preconception consultation  - continue timed intercourse/prenatal vitamin. Provided with handout. Discussed can take up to 12mo of regular timed intercourse to become pregnant. Follow up this August if not pregnant yet.     (F41.1) Generalized anxiety disorder  Plan: sertraline (ZOLOFT) 50 MG tablet  - Discussed option to start medication for mood. Encouraged to continue therapy. Discussed selective serotonin reuptake inhibitor use and relative risks versus benefits to use in preconception/pregnancy. Also reviewed risks of untreated mental illness in  pregnancy/preconception period.  Ultimately, she decided to proceed with Zoloft. Will try 25mg for first week, then increase to 50mg. Follow up in 4wks and adjust dose as needed. Medication use, safety, and side effects reviewed.         ADAM Hopper, CNM

## 2024-03-20 LAB
CHOLEST SERPL-MCNC: 221 MG/DL
FASTING STATUS PATIENT QL REPORTED: YES
FASTING STATUS PATIENT QL REPORTED: YES
GLUCOSE SERPL-MCNC: 95 MG/DL (ref 70–99)
HDLC SERPL-MCNC: 72 MG/DL
LDLC SERPL CALC-MCNC: 131 MG/DL
NONHDLC SERPL-MCNC: 149 MG/DL
TRIGL SERPL-MCNC: 91 MG/DL
TSH SERPL DL<=0.005 MIU/L-ACNC: 2.08 UIU/ML (ref 0.3–4.2)

## 2024-04-26 ENCOUNTER — VIRTUAL VISIT (OUTPATIENT)
Dept: MIDWIFE SERVICES | Facility: CLINIC | Age: 34
End: 2024-04-26
Payer: COMMERCIAL

## 2024-04-26 DIAGNOSIS — F41.9 ANXIETY: Primary | ICD-10-CM

## 2024-04-26 PROCEDURE — 99441 PR PHYSICIAN TELEPHONE EVALUATION 5-10 MIN: CPT | Performed by: ADVANCED PRACTICE MIDWIFE

## 2024-04-26 NOTE — PROGRESS NOTES
S: Telephone call to patient to follow up after starting Zoloft for anxiety last month. She isn't sure if she is noticing improvement as stress has also decreased so hard to say. Denies any adverse side effects. Would like to continue taking at this time and plans to reevaluate in another month. No other concerns today.     O:   No vital signs due to telephone visit    General: well appearing, in NAD  Cardiac: well perfused  Respiratory: non-labored breathing on room air   Psych: alert and oriented     A/P:   (F41.9) Anxiety  (primary encounter diagnosis)  Comment: We discussed that sometimes the benefit is subtle and often not recognized until the medication is discontinued. Agree with patient plan for continuing for one more month as reevaluating. If anxiety increases with discontinuation, can always restart. Discussed weaning by going down to 25mg for one week before stopping. Gave rx for 90 tablets with a refill in case patient desires to continue taking. Encouraged to reach out with any questions or concerns. Pt agrees to plan.   Plan: sertraline (ZOLOFT) 50 MG tablet          Marilee Wallace CNM    Phone-Visit Details    Type of service:  Phone Visit    Originating Location (pt. Location): Home    Distant Location (provider location): On-site    Mode of Communication: Telephone visit    Telephone time: 9 minutes

## 2024-07-16 ENCOUNTER — OFFICE VISIT (OUTPATIENT)
Dept: URGENT CARE | Facility: URGENT CARE | Age: 34
End: 2024-07-16
Payer: COMMERCIAL

## 2024-07-16 DIAGNOSIS — N39.0 URINARY TRACT INFECTION WITHOUT HEMATURIA, SITE UNSPECIFIED: Primary | ICD-10-CM

## 2024-07-16 DIAGNOSIS — B37.31 YEAST INFECTION OF THE VAGINA: ICD-10-CM

## 2024-07-16 PROCEDURE — 87086 URINE CULTURE/COLONY COUNT: CPT

## 2024-07-16 PROCEDURE — 81001 URINALYSIS AUTO W/SCOPE: CPT

## 2024-07-16 PROCEDURE — 99204 OFFICE O/P NEW MOD 45 MIN: CPT | Performed by: FAMILY MEDICINE

## 2024-07-16 PROCEDURE — 87186 SC STD MICRODIL/AGAR DIL: CPT

## 2024-07-16 PROCEDURE — 87210 SMEAR WET MOUNT SALINE/INK: CPT

## 2024-07-17 ENCOUNTER — LAB (OUTPATIENT)
Dept: LAB | Facility: CLINIC | Age: 34
End: 2024-07-17
Payer: COMMERCIAL

## 2024-07-17 DIAGNOSIS — R30.0 DYSURIA: Primary | ICD-10-CM

## 2024-07-17 LAB
ALBUMIN UR-MCNC: NEGATIVE MG/DL
APPEARANCE UR: CLEAR
BACTERIA #/AREA URNS HPF: ABNORMAL /HPF
BILIRUB UR QL STRIP: NEGATIVE
CLUE CELLS: ABNORMAL
COLOR UR AUTO: YELLOW
GLUCOSE UR STRIP-MCNC: NEGATIVE MG/DL
HGB UR QL STRIP: ABNORMAL
KETONES UR STRIP-MCNC: NEGATIVE MG/DL
LEUKOCYTE ESTERASE UR QL STRIP: ABNORMAL
NITRATE UR QL: NEGATIVE
PH UR STRIP: 5.5 [PH] (ref 5–7)
RBC #/AREA URNS AUTO: ABNORMAL /HPF
SP GR UR STRIP: <1.005 (ref 1–1.03)
SQUAMOUS #/AREA URNS AUTO: ABNORMAL /LPF
TRICHOMONAS, WET PREP: ABNORMAL
UROBILINOGEN UR STRIP-ACNC: 0.2 E.U./DL
WBC #/AREA URNS AUTO: ABNORMAL /HPF
WBC'S/HIGH POWER FIELD, WET PREP: ABNORMAL
YEAST, WET PREP: PRESENT

## 2024-07-18 VITALS
RESPIRATION RATE: 15 BRPM | HEART RATE: 91 BPM | TEMPERATURE: 98.4 F | BODY MASS INDEX: 25.58 KG/M2 | OXYGEN SATURATION: 100 % | WEIGHT: 149 LBS

## 2024-07-18 DIAGNOSIS — N39.0 URINARY TRACT INFECTION WITHOUT HEMATURIA, SITE UNSPECIFIED: Primary | ICD-10-CM

## 2024-07-18 LAB — BACTERIA UR CULT: ABNORMAL

## 2024-07-18 RX ORDER — NITROFURANTOIN 25; 75 MG/1; MG/1
100 CAPSULE ORAL 2 TIMES DAILY
Status: SHIPPED
Start: 2024-07-18 | End: 2024-07-23

## 2024-07-18 NOTE — PROGRESS NOTES
Chief Complaint   Patient presents with    Dysuria     Ivy was seen today for dysuria.    Diagnoses and all orders for this visit:    Urinary tract infection without hematuria, site unspecified    Yeast infection of the vagina      ASSESSMENT: UTI uncomplicated without evidence of pyelonephritis    PLAN: Treatment per orders - also push fluids, may use Pyridium OTC prn. Call or return to clinic prn if these symptoms worsen or fail to improve as anticipated.  Treated with macrobid for uti and diflucan for yeast infection   Hand written Scripts written for it and given to patient     Maura Bolton MD       SUBJECTIVE: Ivy Arriaga is a 33 year old female who complains of urinary frequency, urgency and dysuria x 1 days, without flank pain, fever, chills, or she has abnormal vaginal discharge or  but has nobleeding.     OBJECTIVE: Appears well, in no apparent distress.  Vital signs are normal. The abdomen is soft without tenderness, guarding, mass, rebound or organomegaly. No CVA tenderness or inguinal adenopathy noted. Urine dipstick shows    Large LE  wbc 25-50  Epithelial cells     Maura Bolton MD

## 2024-07-18 NOTE — PATIENT INSTRUCTIONS
Please finish the antibiotic even if you are feeling better.  Watch for worsening signs of infection     Maura Bolton MD

## 2025-02-06 ENCOUNTER — OFFICE VISIT (OUTPATIENT)
Dept: OBGYN | Facility: CLINIC | Age: 35
End: 2025-02-06
Payer: COMMERCIAL

## 2025-02-06 VITALS
SYSTOLIC BLOOD PRESSURE: 133 MMHG | HEART RATE: 98 BPM | WEIGHT: 155.3 LBS | OXYGEN SATURATION: 99 % | DIASTOLIC BLOOD PRESSURE: 89 MMHG | BODY MASS INDEX: 26.66 KG/M2

## 2025-02-06 DIAGNOSIS — Z01.419 ENCOUNTER FOR GYNECOLOGICAL EXAMINATION WITHOUT ABNORMAL FINDING: Primary | ICD-10-CM

## 2025-02-06 DIAGNOSIS — R30.0 DYSURIA: ICD-10-CM

## 2025-02-06 DIAGNOSIS — Z31.69 ENCOUNTER FOR PRECONCEPTION CONSULTATION: ICD-10-CM

## 2025-02-06 DIAGNOSIS — L72.11 PILAR CYST OF SCALP: ICD-10-CM

## 2025-02-06 DIAGNOSIS — Z23 NEED FOR PROPHYLACTIC VACCINATION AND INOCULATION AGAINST INFLUENZA: ICD-10-CM

## 2025-02-06 ASSESSMENT — PATIENT HEALTH QUESTIONNAIRE - PHQ9
SUM OF ALL RESPONSES TO PHQ QUESTIONS 1-9: 3
SUM OF ALL RESPONSES TO PHQ QUESTIONS 1-9: 3
10. IF YOU CHECKED OFF ANY PROBLEMS, HOW DIFFICULT HAVE THESE PROBLEMS MADE IT FOR YOU TO DO YOUR WORK, TAKE CARE OF THINGS AT HOME, OR GET ALONG WITH OTHER PEOPLE: NOT DIFFICULT AT ALL

## 2025-02-06 NOTE — PROGRESS NOTES
Ivy is a 34 year old  female who presents for annual exam.     Menses are irregular and crampy lasting 5 days.  Menses flow: normal.  Patient's last menstrual period was 2025 (exact date).. Using none for contraception.  She is currently considering pregnancy.  Besides routine health maintenance, she has questions regarding ttc, uti, and derm referral.  GYNECOLOGIC HISTORY:  Menarche: 13  Age at first intercourse: 28 Number of lifetime partners: 1  Ivy is sexually active with 1 male partner(s) and is currently in monogamous relationship with .    History sexually transmitted infections:No STD history  STI testing offered?  Declined  MARK exposure: Unknown  History of abnormal Pap smear: No - age 30-64 HPV with reflex Pap every 5 years recommended  Family history of breast CA: No  Family history of uterine/ovarian CA: No    Family history of colon CA: No    HEALTH MAINTENANCE:  Cholesterol: (  Cholesterol   Date Value Ref Range Status   2024 221 (H) <200 mg/dL Final   2023 227 (H) <200 mg/dL Final   2021 204.0 (H) 0.0 - 200.0 Final    History of abnormal lipids: Yes  Mammo: NA . History of abnormal Mammo: Not applicable.  Regular Self Breast Exams: No  Calcium/Vitamin D intake: source:  dairy, dietary supplement(s) Adequate? Yes  TSH: (  TSH   Date Value Ref Range Status   2024 2.08 0.30 - 4.20 uIU/mL Final    )  Pap; (  Lab Results   Component Value Date    PAP NIL 2021    )    HISTORY:  OB History    Para Term  AB Living   0 0 0 0 0 0   SAB IAB Ectopic Multiple Live Births   0 0 0 0 0     Past Medical History:   Diagnosis Date    Intrinsic eczema      Past Surgical History:   Procedure Laterality Date    wisdom teeth       Family History   Problem Relation Age of Onset    Fibroids Mother         hysterectomy    Diabetes Type 1 Father     Endometriosis Sister     Hypertension Maternal Grandmother     Parkinsonism Paternal Grandmother      Social  History     Socioeconomic History    Marital status:    Occupational History    Occupation: Teacher Pre-K   Tobacco Use    Smoking status: Never    Smokeless tobacco: Never   Substance and Sexual Activity    Alcohol use: Yes     Alcohol/week: 5.0 standard drinks of alcohol     Types: 5 Standard drinks or equivalent per week    Drug use: Never    Sexual activity: Yes     Partners: Male   Social History Narrative    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  Grew up in [place].    Recently  2019.        Has a good support system.    Feels safe in all environments.    Wears seatbelt 100% of the time    Wears helmet while biking.    Denies history of abuse, past or present, physical, sexual or emotional.     Marsha Emery PA-C    03/09/21         Social Drivers of Health     Financial Resource Strain: Low Risk  (3/9/2021)    Overall Financial Resource Strain (CARDIA)     Difficulty of Paying Living Expenses: Not hard at all   Food Insecurity: No Food Insecurity (3/9/2021)    Hunger Vital Sign     Worried About Running Out of Food in the Last Year: Never true     Ran Out of Food in the Last Year: Never true   Transportation Needs: No Transportation Needs (3/9/2021)    PRAPARE - Transportation     Lack of Transportation (Medical): No     Lack of Transportation (Non-Medical): No   Physical Activity: Sufficiently Active (3/9/2021)    Exercise Vital Sign     Days of Exercise per Week: 5 days     Minutes of Exercise per Session: 60 min   Stress: No Stress Concern Present (3/9/2021)    [place] [employer] - Occupational Stress Questionnaire     Feeling of Stress : Only a little   Social Connections: Unknown (3/9/2021)    Social Connection and Isolation Panel [NHANES]     Frequency of Communication with Friends and Family: Not asked     Frequency of Social Gatherings with Friends and Family: Not asked     Attends Adventism Services: Not asked     Active Member of Clubs or Organizations: Not  asked     Attends Club or Organization Meetings: Not asked     Marital Status: Not asked   Interpersonal Safety: Not At Risk (3/9/2021)    Humiliation, Afraid, Rape, and Kick questionnaire     Fear of Current or Ex-Partner: No     Emotionally Abused: No     Physically Abused: No     Sexually Abused: No       Current Outpatient Medications:     Prenatal Vit-Fe Fumarate-FA (PRENATAL MULTIVITAMIN  PLUS IRON) 27-1 MG TABS, Take by mouth daily, Disp: , Rfl:     sertraline (ZOLOFT) 50 MG tablet, Take 1 tablet (50 mg) by mouth daily, Disp: 90 tablet, Rfl: 1    sertraline (ZOLOFT) 50 MG tablet, Take 1/2 tablet daily (25mg) for 1 week, then increase to full tab (50mg) daily, Disp: 30 tablet, Rfl: 1    VITAMIN D PO, , Disp: , Rfl:      Allergies   Allergen Reactions    Penicillins Hives       Past medical, surgical, social and family history were reviewed and updated in EPIC.    ROS:   C:     NEGATIVE for fever, chills, change in weight  I:       NEGATIVE for worrisome rashes, moles or lesions  E:     NEGATIVE for vision changes or irritation  E/M: NEGATIVE for ear, mouth and throat problems  R:     NEGATIVE for significant cough or SOB  CV:   NEGATIVE for chest pain, palpitations or peripheral edema  GI:     NEGATIVE for nausea, abdominal pain, heartburn, or change in bowel habits  :   NEGATIVE for frequency, dysuria, hematuria, vaginal discharge, or irregular bleeding  M:     NEGATIVE for significant arthralgias or myalgia  N:      NEGATIVE for weakness, dizziness or paresthesias  E:      NEGATIVE for temperature intolerance, skin/hair changes  P:      NEGATIVE for changes in mood or affect.    EXAM:  /89 (Patient Position: Sitting)   Pulse 98   Wt 70.4 kg (155 lb 4.8 oz)   LMP 01/09/2025 (Exact Date)   SpO2 99%   BMI 26.66 kg/m     BMI: Body mass index is 26.66 kg/m .  Constitutional: healthy, alert and no distress  Head: Normocephalic. No masses, lesions, tenderness or abnormalities  Neck: Neck supple.  Trachea midline. No adenopathy. Thyroid symmetric, normal size.   Cardiovascular: RRR.   Respiratory: Negative.   Breast: Breasts reveal mild symmetric fibrocystic densities, but there are no dominant, discrete, fixed or suspicious masses found.  Gastrointestinal: Abdomen soft, non-tender, non-distended. No masses, organomegaly.  :  Vulva:  No external lesions, normal female hair distribution, no inguinal adenopathy.    Urethra:  Midline, non-tender, well supported, no discharge  Vagina:  Moist, pink, no abnormal discharge, no lesions  Uterus:  Normal size, anteverted , non-tender, freely mobile  Ovaries:  No masses appreciated, non-tender, mobile  Rectal Exam: deferred  Musculoskeletal: extremities normal  Skin: no suspicious lesions or rashes  Psychiatric: Affect appropriate, cooperative,mentation appears normal.     COUNSELING:   Reviewed preventive health counseling, as reflected in patient instructions       Regular exercise       Healthy diet/nutrition       Family planning       Preconception counseling   reports that she has never smoked. She has never used smokeless tobacco.    Body mass index is 26.66 kg/m .    FRAX Risk Assessment    ASSESSMENT:  34 year old female with satisfactory annual exam  1. Encounter for gynecological examination without abnormal finding (Primary)  - HPV and Gynecologic Cytology Panel - Recommended Age 30-65 Years  - CBC with platelets; Future  - Hemoglobin A1c; Future  - TSH with free T4 reflex; Future    2. Encounter for preconception consultation  -period historically 28-30d currently has been more irregular 28-40 lasting 5 d  -discussed regular timed intercourse  -discussed opk tracking  -CD3 labs  -tvus  -consider HSG  -discussed when [facility] would be recc- research insurance coverage  -recc spouse to get physical- and semen analysis  -discussed continue healthy choices working out pnv  - Estradiol; Future  - Follicle stimulating hormone; Future  - Anti-Mullerian hormone;  Future  - Prolactin; Future  - US Pelvic Complete with Transvaginal; Future    3. Need for prophylactic vaccination and inoculation against influenza  -flu vacc     4. Dysuria  -hx recent e. Coli uti x3- discussed avoiding contact irritants, emptying bladder  -warning sx of ascending infection  - UA with Microscopic - lab collect; Future  - Urine Culture Aerobic Bacterial - lab collect; Future    5. Pilar cyst of scalp  -had prior workup- years ago- needs to re-establish relationship and desires removal  - Adult Dermatology  Referral; Future    Answers submitted by the patient for this visit:  Patient Health Questionnaire (Submitted on 2/6/2025)  If you checked off any problems, how difficult have these problems made it for you to do your work, take care of things at home, or get along with other people?: Not difficult at all  PHQ9 TOTAL SCORE: 3    Rtc for tvus and cd3 labs  ADAM Jiang CNP

## 2025-02-11 LAB
BKR AP ASSOCIATED HPV REPORT: NORMAL
BKR LAB AP GYN ADEQUACY: NORMAL
BKR LAB AP GYN INTERPRETATION: NORMAL
BKR LAB AP PREVIOUS ABNORMAL: NORMAL
PATH REPORT.COMMENTS IMP SPEC: NORMAL
PATH REPORT.COMMENTS IMP SPEC: NORMAL
PATH REPORT.RELEVANT HX SPEC: NORMAL

## 2025-02-17 ENCOUNTER — LAB (OUTPATIENT)
Dept: LAB | Facility: CLINIC | Age: 35
End: 2025-02-17
Payer: COMMERCIAL

## 2025-02-17 DIAGNOSIS — Z31.69 ENCOUNTER FOR PRECONCEPTION CONSULTATION: ICD-10-CM

## 2025-02-17 DIAGNOSIS — Z01.419 ENCOUNTER FOR GYNECOLOGICAL EXAMINATION WITHOUT ABNORMAL FINDING: ICD-10-CM

## 2025-02-17 DIAGNOSIS — R30.0 DYSURIA: ICD-10-CM

## 2025-02-17 LAB
ERYTHROCYTE [DISTWIDTH] IN BLOOD BY AUTOMATED COUNT: 11.1 % (ref 10–15)
EST. AVERAGE GLUCOSE BLD GHB EST-MCNC: 103 MG/DL
ESTRADIOL SERPL-MCNC: 44 PG/ML
FSH SERPL IRP2-ACNC: 6.8 MIU/ML
HBA1C MFR BLD: 5.2 % (ref 0–5.6)
HCT VFR BLD AUTO: 44.1 % (ref 35–47)
HGB BLD-MCNC: 14.9 G/DL (ref 11.7–15.7)
MCH RBC QN AUTO: 32.3 PG (ref 26.5–33)
MCHC RBC AUTO-ENTMCNC: 33.8 G/DL (ref 31.5–36.5)
MCV RBC AUTO: 96 FL (ref 78–100)
MIS SERPL-MCNC: 2.75 NG/ML (ref 0.58–8.1)
PLATELET # BLD AUTO: 223 10E3/UL (ref 150–450)
PROLACTIN SERPL 3RD IS-MCNC: 18 NG/ML (ref 5–23)
RBC # BLD AUTO: 4.62 10E6/UL (ref 3.8–5.2)
TSH SERPL DL<=0.005 MIU/L-ACNC: 1.77 UIU/ML (ref 0.3–4.2)
WBC # BLD AUTO: 4 10E3/UL (ref 4–11)

## 2025-02-17 PROCEDURE — 82670 ASSAY OF TOTAL ESTRADIOL: CPT

## 2025-02-17 PROCEDURE — 84146 ASSAY OF PROLACTIN: CPT

## 2025-02-17 PROCEDURE — 85027 COMPLETE CBC AUTOMATED: CPT

## 2025-02-17 PROCEDURE — 83001 ASSAY OF GONADOTROPIN (FSH): CPT

## 2025-02-17 PROCEDURE — 83036 HEMOGLOBIN GLYCOSYLATED A1C: CPT

## 2025-02-17 PROCEDURE — 36415 COLL VENOUS BLD VENIPUNCTURE: CPT

## 2025-02-17 PROCEDURE — 82166 ASSAY ANTI-MULLERIAN HORM: CPT

## 2025-02-17 PROCEDURE — 84443 ASSAY THYROID STIM HORMONE: CPT

## 2025-02-27 ENCOUNTER — OFFICE VISIT (OUTPATIENT)
Dept: OBGYN | Facility: CLINIC | Age: 35
End: 2025-02-27
Payer: COMMERCIAL

## 2025-02-27 ENCOUNTER — ANCILLARY PROCEDURE (OUTPATIENT)
Dept: ULTRASOUND IMAGING | Facility: CLINIC | Age: 35
End: 2025-02-27
Payer: COMMERCIAL

## 2025-02-27 VITALS
WEIGHT: 152.8 LBS | OXYGEN SATURATION: 99 % | HEART RATE: 64 BPM | BODY MASS INDEX: 26.09 KG/M2 | HEIGHT: 64 IN | DIASTOLIC BLOOD PRESSURE: 86 MMHG | SYSTOLIC BLOOD PRESSURE: 124 MMHG

## 2025-02-27 DIAGNOSIS — E28.2 PCOS (POLYCYSTIC OVARIAN SYNDROME): ICD-10-CM

## 2025-02-27 DIAGNOSIS — N97.9 FEMALE INFERTILITY: Primary | ICD-10-CM

## 2025-02-27 DIAGNOSIS — Q51.3 BICORNUATE UTERUS: ICD-10-CM

## 2025-02-27 DIAGNOSIS — Z31.69 ENCOUNTER FOR PRECONCEPTION CONSULTATION: ICD-10-CM

## 2025-02-27 PROCEDURE — 76830 TRANSVAGINAL US NON-OB: CPT | Performed by: OBSTETRICS & GYNECOLOGY

## 2025-02-27 PROCEDURE — 76856 US EXAM PELVIC COMPLETE: CPT | Performed by: OBSTETRICS & GYNECOLOGY

## 2025-02-27 NOTE — PROGRESS NOTES
[facility]- OBGYN    CC: infertility consult, results review    S:Ivy Arriaga is a 34 year old  who presents today for infertility discussion.  Patient was seen on 25 for annual exam and labs/imaging were ordered to start work up.  Patient reports she and her partner have been having timed intercourse for at least a year and unprotected intercourse for about 2 years.  She has been tracking her cycle.  Over the past 4-5 months they have become much more irregular, occurring q 28-40 days.  She is using OPKs and they do NOT turn positive.  Her partner is in good health and he has a scheduled appointment with his doctor and plans to get a semen analysis in March.  Her partner has no other pregnancies or children.      OBGYN Hx:   OB History    Para Term  AB Living   0 0 0 0 0 0   SAB IAB Ectopic Multiple Live Births   0 0 0 0 0       Patient's last menstrual period was 2025 (exact date).  Menses: see HPI  Sexually active with male partner  STD Hx:denies any history of PID, gonorrhea, or chlamydia   Pap hx: 25 NILM HPV negative    PMH:   Past Medical History:   Diagnosis Date    Bicornuate uterus 2025    diagnosed by ultrasound    Intrinsic eczema     PCOS (polycystic ovarian syndrome) 2025    irregular periods, polycystic appearing ovaries       PSH:  Past Surgical History:   Procedure Laterality Date    wisdom teeth         Meds:  Current Outpatient Medications   Medication Sig Dispense Refill    Prenatal Vit-Fe Fumarate-FA (PRENATAL MULTIVITAMIN  PLUS IRON) 27-1 MG TABS Take by mouth daily      VITAMIN D PO       sertraline (ZOLOFT) 50 MG tablet Take 1/2 tablet daily (25mg) for 1 week, then increase to full tab (50mg) daily 30 tablet 1     No current facility-administered medications for this visit.       Allergies:  Allergies   Allergen Reactions    Penicillins Hives       O: Patient Vitals for the past 24 hrs:   BP Pulse SpO2 Height Weight   25  "0948 124/86 64 99 % 1.626 m (5' 4\") 69.3 kg (152 lb 12.8 oz)   ]  Exam:  General- awake, alert, answering questions appropriately, appears comfortable  CV- regular rate  Lung- breathing comfortably on room air    Imaging and Labs:   Latest Reference Range & Units 25 09:54   Estradiol pg/mL 44   FSH mIU/mL 6.8   Estimated Average Glucose <117 mg/dL 103   Hemoglobin A1C 0.0 - 5.6 % 5.2   Prolactin 5 - 23 ng/mL 18   TSH 0.30 - 4.20 uIU/mL 1.77      Latest Reference Range & Units 25 09:54   Anti-Mullerian Hormone 0.580 - 8.100 ng/mL 2.750     Gynecological Ultrasound Report  Pelvic U/S - Transabdominal and Transvaginal   [facility]etric[facility]necology  Referring Provider: ADAM Jiang CNP   Sonographer:  Janice Carmichael, Registered Diagnostic Medical Sonographer, [facility]  Indication: Infertility, evaluate for structural abnormalities  LMP: 2025  History: [facility]  Gynecological Ultrasonography:   Uterus: anteverted. Contour is bicornuate with the split seen high in the cavity.  Size: 7.75 x 4.95 x 3.38 cm  Endometrium: Thickness Total before the split 7.8 mm, rt horn endo thickness 4.8 mm, left horn endo thickness 6.0 mm  Findings: Bicornuate Ut.  No obvious focal lesions observed.  Active flowing tissue observed in the left horn portion of the cavity  Right Ovary: 4.37 x 2.20 x 2.23 cm. Multiple small cysts on periphery  Left Ovary: 4.07 x 2.63 x 2.08 cm. Multiple small cysts on periphery and one follicle measuring over 10 mm at 13 x 13 x 10 mm with a daughter cyst  Cul de Sac Free Fluid: Trace  Technique: Transvaginal Imaging performed  Transabdominal Imaging performed  3D imaging performed     Impression:      Bicornuate uterus.   Both ovaries have mulitple small follicles around the periphery, consistent with the appearance of polycystic ovaries.  Recommend clinical correlation.     EULA FORD MD    A&P: Ivy Arriaga is a 34 year old  who presents today " for infertility discussion.      (N97.9) Female infertility  (primary encounter diagnosis)  Comment: Reviewed diagnostic criteria for primary infertility in patient <35 years old is no pregnancy with regular timed intercourse for a year.  She meets this criteria.  Given irregular period, negative OPKs, and meeting criteria for PCOS anovulation/oligo ovulation is a contributing factor to her infertility.  Reviewed preliminary ultrasound findings today. Reviewed previous cycle day #5 labs and AMH WNL from 25.  Discussed recommendation for cycle day 21 progesterone lab, HSG, and semen analysis to complete work up.  Her partner has visit with his provider and plans SA in March.  Discussed the rest of the work up is normal and it is confirmed that anovulation is only factor affecting fertility, then we would plan letrozole for ovulation induction.  Explained if any component of male factor, then I would refer her to [facility] for consult to consider IUI with ovulation induction.  If bilateral fallopian tube blockage, then would refer to [facility] for IVF.    Plan: Progesterone, XR Hysterosalpingogram  Follow up visit should be scheduled after above testing and semen analysis are resulted.    (E28.2) PCOS (polycystic ovarian syndrome)  Comment: Reviewed with patient [place] criteria.  She meets criteria for diagnosis of PCOS due to ovarian volume and polycystic appearing right ovary on ultrasound along with history of irregular period.    Plan: Explained importance of menses at least every 3 months for endometrial protection and screening for pre-diabetes.  She has normal HgbA1c on 25.    See above for fertility discussion     (Q51.3) Bicornuate uterus  Comment: Reviewed ultrasound findings of bicornuate uterus.  Discussed pregnancy risks including increased risk of  labor/ delivery and fetal malpresentation.  Explained that she would NOT be a future candidate for IUD for birth control or cycle regulation.   Plan:  expect to see this finding again on HSG.  This was also reviewed with patient.    Dianna Salgado MD    A total of 35 minutes was spent on 2/27/25 reviewing results, discussion with patient, patient counseling, and on documentation.

## 2025-03-05 ENCOUNTER — LAB (OUTPATIENT)
Dept: LAB | Facility: CLINIC | Age: 35
End: 2025-03-05
Payer: COMMERCIAL

## 2025-03-05 DIAGNOSIS — N97.9 FEMALE INFERTILITY: ICD-10-CM

## 2025-03-05 LAB — PROGEST SERPL-MCNC: 0.5 NG/ML

## 2025-03-05 PROCEDURE — 36415 COLL VENOUS BLD VENIPUNCTURE: CPT

## 2025-03-05 PROCEDURE — 84144 ASSAY OF PROGESTERONE: CPT

## 2025-03-10 ENCOUNTER — MYC MEDICAL ADVICE (OUTPATIENT)
Dept: OBGYN | Facility: CLINIC | Age: 35
End: 2025-03-10
Payer: COMMERCIAL

## 2025-03-10 DIAGNOSIS — N97.9 FEMALE INFERTILITY: Primary | ICD-10-CM

## 2025-03-11 NOTE — TELEPHONE ENCOUNTER
2/27/25 infertility consult with Dr Salgado.   Did draw a progesterone on CD 22 that showed she did not ovulate. She got a positive OPK at home around CD 25 and is wondering if she can test progesterone again 7 days after that date to see if she actually did.  Order pended    Carola CARCAMO RN   [facility] Triage RN

## 2025-03-24 ENCOUNTER — TELEPHONE (OUTPATIENT)
Dept: OBGYN | Facility: CLINIC | Age: 35
End: 2025-03-24
Payer: COMMERCIAL

## 2025-03-24 DIAGNOSIS — N97.9 FEMALE INFERTILITY: Primary | ICD-10-CM

## 2025-03-24 NOTE — TELEPHONE ENCOUNTER
HSG scheduled at [facility]  Date and time of HS25 1PM  Lab time: 12PM  Performing Provider: Elba Dillard  LMP Date: 25   UPT ordered: Yes  [employer] Message Sent (passcode): Yes  Date: 25       Jessica  She/her/hers  [facility]

## 2025-04-01 ENCOUNTER — LAB (OUTPATIENT)
Dept: LAB | Facility: CLINIC | Age: 35
End: 2025-04-01
Payer: COMMERCIAL

## 2025-04-01 ENCOUNTER — HOSPITAL ENCOUNTER (OUTPATIENT)
Dept: GENERAL RADIOLOGY | Facility: CLINIC | Age: 35
Discharge: HOME | End: 2025-04-01
Attending: OBSTETRICS & GYNECOLOGY | Admitting: OBSTETRICS & GYNECOLOGY
Payer: COMMERCIAL

## 2025-04-01 DIAGNOSIS — N97.9 FEMALE INFERTILITY: ICD-10-CM

## 2025-04-01 LAB — HCG UR QL: NEGATIVE

## 2025-04-01 PROCEDURE — 81025 URINE PREGNANCY TEST: CPT

## 2025-04-01 PROCEDURE — 74740 X-RAY FEMALE GENITAL TRACT: CPT

## 2025-04-01 PROCEDURE — 255N000002 HC RX 255 OP 636: Performed by: OBSTETRICS & GYNECOLOGY

## 2025-04-01 RX ORDER — IOPAMIDOL 510 MG/ML
50 INJECTION, SOLUTION INTRAVASCULAR ONCE
Status: COMPLETED | OUTPATIENT
Start: 2025-04-01 | End: 2025-04-01

## 2025-04-01 RX ADMIN — IOPAMIDOL 15 ML: 510 INJECTION, SOLUTION INTRAVASCULAR at 13:42

## 2025-04-02 NOTE — PROCEDURES
The patient is seen today for a hysterosalpingogram to discern tubal patency.     Procedure:  After verbal informed consent was obtained, they patient was placed in dorsal lithotomy on the fluoroscopy table. A speculum was placed in the vagina and the vagina and cervix were prepped with chlorhexidine. A tenaculum was placed on the anterior lip of the cervix.    A Zumi cannula was placed into the cervical os, the balloon inflated,  and the speculum was removed.    Under fluoroscopy, the Isovue dye was injected.    The radiologic findings will be documented by the interpreting radiologist.    The cannula and tenaculum were removed.    The patient tolerated the procedure with some discomfort and was discharged to home.        Elba Dillard MD

## 2025-04-07 ENCOUNTER — MYC MEDICAL ADVICE (OUTPATIENT)
Dept: OBGYN | Facility: CLINIC | Age: 35
End: 2025-04-07
Payer: COMMERCIAL

## 2025-04-09 NOTE — TELEPHONE ENCOUNTER
I think she wants to talk over labs, hsg, US and results she can do video visit, she is TTC and having difficulty  AH also checked in 4/2 to see if her partner did ADAM Cheek CNP

## 2025-04-11 NOTE — PROGRESS NOTES
[facility] Dermatology Note  Encounter Date: Apr 14, 2025  Office Visit     Dermatology Problem List:  1. Pilar cyst, scalp x3  - referral to derm surg for excision  2. Eczematous dermatitis  - bleach baths  3. Haider's nevus, L upper chest    ____________________________________________    Assessment & Plan:    # Pilar cysts, L upper occipital scalp x 1, L vertex scalp x 1, L lower temporal scalp x 1.  Cyst management options were reviewed. The patient elects for excision due to clinical symptoms. Patient recommended scheduling in dermatology surgery.    - Reviewed derm surgery risks and benefits. Referral placed today. (Prefers excisions in derm/surg)          Procedures Performed:   none    Follow-up: schedule with derm surgery    Staff and Scribe:     Scribe Disclosure:   I, Laurence Pabon, am serving as a scribe to document services personally performed by Vika Estes PA-C based on data collection and the provider's statements to me.     Provider Disclosure:   The documentation recorded by the scribe accurately reflects the services I personally performed and the decisions made by me.    All risks, benefits and alternatives were discussed with patient.  Patient is in agreement and understands the assessment and plan.  All questions were answered.  Sun Screen Education was given.   Return to Clinic as needed.   Vika Estes PA-C   [facility]    ____________________________________________    CC: Derm Problem (Pt reports pilar cysts on scalp that have been present for ~15 years, no pain just an annoyance)    HPI:  Ms. Ivy Arriaga is a(n) 34 year old female who presents today as a return patient for derm problem . Referred by aKssidy MEDINA CNP for Pilar cyst of scalp. Previously seen in dermatology 4/14/22 by Dr Ranjeet Lauren for skin check.    Today, patient reports pilar cysts on the scalp, present about 15 years. Not really painful  but do cause great annoyance.       Patient is otherwise feeling well, without additional skin concerns.    Labs Reviewed:  N/A    Physical Exam:  Vitals: There were no vitals taken for this visit.  SKIN: Focused examination of scalp was performed.  - 2x2 cm dome shaped nodule L upper occipital scalp  - 1x1 cm dome shaped nodule on the L vertex scalp.  - On the L lower temporal scalp there is a 9 mm dome shaped nodule.  - No other lesions of concern on areas examined.     Medications:  Current Outpatient Medications   Medication Sig Dispense Refill    Prenatal Vit-Fe Fumarate-FA (PRENATAL MULTIVITAMIN  PLUS IRON) 27-1 MG TABS Take by mouth daily (Patient not taking: Reported on 4/14/2025)      VITAMIN D PO  (Patient not taking: Reported on 4/14/2025)       No current facility-administered medications for this visit.      Past Medical History:   Patient Active Problem List   Diagnosis    Menorrhagia with regular cycle    Intrinsic eczema    Vitamin D deficiency    HECTOR (generalized anxiety disorder)    Rapid resting heart rate    Pilar cyst    Multiple benign nevi    Other eczema     Past Medical History:   Diagnosis Date    Bicornuate uterus 02/27/2025    diagnosed by ultrasound    Intrinsic eczema     PCOS (polycystic ovarian syndrome) 02/27/2025    irregular periods, polycystic appearing ovaries        CC ADAM Jiang CNP  [address]  [place] [address] on close of this encounter.

## 2025-04-14 ENCOUNTER — OFFICE VISIT (OUTPATIENT)
Dept: DERMATOLOGY | Facility: CLINIC | Age: 35
End: 2025-04-14
Payer: COMMERCIAL

## 2025-04-14 DIAGNOSIS — L72.11 PILAR CYST OF SCALP: ICD-10-CM

## 2025-04-14 ASSESSMENT — PAIN SCALES - GENERAL: PAINLEVEL_OUTOF10: NO PAIN (0)

## 2025-04-14 NOTE — NURSING NOTE
Dermatology Rooming Note    Ivy Arriaga's goals for this visit include:   Chief Complaint   Patient presents with    Derm Problem     Pt reports pilar cysts on scalp that have been present for ~15 years, no pain just an annoyance     Marlee Vincent CA

## 2025-04-14 NOTE — LETTER
4/14/2025       RE: Ivy Arriaga  [address]  [place] [address]     Dear Colleague,    Thank you for referring your patient, Ivy Arriaga, to the [facility] [place] [facility]. Please see a copy of my visit note below.      [facility] Dermatology Note  Encounter Date: Apr 14, 2025  Office Visit     Dermatology Problem List:  1. Pilar cyst, scalp x3  - referral to derm surg for excision  2. Eczematous dermatitis  - bleach baths  3. Haider's nevus, L upper chest    ____________________________________________    Assessment & Plan:    # Pilar cysts, L upper occipital scalp x 1, L vertex scalp x 1, L lower temporal scalp x 1.  Cyst management options were reviewed. The patient elects for excision due to clinical symptoms. Patient recommended scheduling in dermatology surgery.    - Reviewed derm surgery risks and benefits. Referral placed today. (Prefers excisions in derm/surg)          Procedures Performed:   none    Follow-up: schedule with derm surgery    Staff and Scribe:     Scribe Disclosure:   I, Laurence Pabon, am serving as a scribe to document services personally performed by Vika Estes PA-C based on data collection and the provider's statements to me.     Provider Disclosure:   The documentation recorded by the scribe accurately reflects the services I personally performed and the decisions made by me.    All risks, benefits and alternatives were discussed with patient.  Patient is in agreement and understands the assessment and plan.  All questions were answered.  Sun Screen Education was given.   Return to Clinic as needed.   Vika Estes PA-C   [facility]    ____________________________________________    CC: Derm Problem (Pt reports pilar cysts on scalp that have been present for ~15 years, no pain just an annoyance)    HPI:  Ms. Ivy Arriaga is a(n)  34 year old female who presents today as a return patient for derm problem . Referred by Kassidy MEDINA CNP for Pilar cyst of scalp. Previously seen in dermatology 4/14/22 by Dr Ranjeet Lauren for skin check.    Today, patient reports pilar cysts on the scalp, present about 15 years. Not really painful but do cause great annoyance.       Patient is otherwise feeling well, without additional skin concerns.    Labs Reviewed:  N/A    Physical Exam:  Vitals: There were no vitals taken for this visit.  SKIN: Focused examination of scalp was performed.  - 2x2 cm dome shaped nodule L upper occipital scalp  - 1x1 cm dome shaped nodule on the L vertex scalp.  - On the L lower temporal scalp there is a 9 mm dome shaped nodule.  - No other lesions of concern on areas examined.     Medications:  Current Outpatient Medications   Medication Sig Dispense Refill     Prenatal Vit-Fe Fumarate-FA (PRENATAL MULTIVITAMIN  PLUS IRON) 27-1 MG TABS Take by mouth daily (Patient not taking: Reported on 4/14/2025)       VITAMIN D PO  (Patient not taking: Reported on 4/14/2025)       No current facility-administered medications for this visit.      Past Medical History:   Patient Active Problem List   Diagnosis     Menorrhagia with regular cycle     Intrinsic eczema     Vitamin D deficiency     HECTOR (generalized anxiety disorder)     Rapid resting heart rate     Pilar cyst     Multiple benign nevi     Other eczema     Past Medical History:   Diagnosis Date     Bicornuate uterus 02/27/2025    diagnosed by ultrasound     Intrinsic eczema      PCOS (polycystic ovarian syndrome) 02/27/2025    irregular periods, polycystic appearing ovaries        CC ADAM Jiang CNP  [address]  [place] [address] on close of this encounter.      Again, thank you for allowing me to participate in the care of your patient.      Sincerely,    Vika Estes PA-C

## 2025-04-16 ENCOUNTER — TELEPHONE (OUTPATIENT)
Dept: DERMATOLOGY | Facility: CLINIC | Age: 35
End: 2025-04-16
Payer: COMMERCIAL

## 2025-04-16 NOTE — TELEPHONE ENCOUNTER
Left patient a voicemail to schedule an excision for   two pilar cysts, left lower occipital growing (2 cm x 2 cm) prefers excision in derm surgery.        with Dr. Avila. Provided my direct phone number.    Martha Holbrook on 4/16/2025 at 8:32 AM

## 2025-05-05 ENCOUNTER — VIRTUAL VISIT (OUTPATIENT)
Dept: OBGYN | Facility: CLINIC | Age: 35
End: 2025-05-05
Payer: COMMERCIAL

## 2025-05-05 VITALS — BODY MASS INDEX: 26.23 KG/M2 | HEIGHT: 64 IN

## 2025-05-05 DIAGNOSIS — E28.2 PCOS (POLYCYSTIC OVARIAN SYNDROME): Primary | ICD-10-CM

## 2025-05-05 DIAGNOSIS — Z34.00 ENCOUNTER FOR SUPERVISION OF NORMAL FIRST PREGNANCY: ICD-10-CM

## 2025-05-05 DIAGNOSIS — Q51.3 BICORNATE UTERUS: ICD-10-CM

## 2025-05-05 DIAGNOSIS — Z23 NEED FOR DIPHTHERIA-TETANUS-PERTUSSIS (TDAP) VACCINE: ICD-10-CM

## 2025-05-05 PROBLEM — R00.0 RAPID RESTING HEART RATE: Status: RESOLVED | Noted: 2019-05-13 | Resolved: 2025-05-05

## 2025-05-05 PROBLEM — L30.8 OTHER ECZEMA: Status: RESOLVED | Noted: 2022-05-09 | Resolved: 2025-05-05

## 2025-05-05 PROBLEM — E55.9 VITAMIN D DEFICIENCY: Status: RESOLVED | Noted: 2021-03-19 | Resolved: 2025-05-05

## 2025-05-05 PROCEDURE — 99207 PR NO CHARGE NURSE ONLY: CPT

## 2025-05-05 NOTE — PROGRESS NOTES
Important Information for Provider:     New ob nurse intake by phone, first pregnancy. Handouts reviewed. Dicussed genetic screening. Ultrasound and NOB with [facility] 5/14/2025  Family history of thyroid nodules( mother). Ordered TSH with NOB labs  Patient works full time ( financial work) , lives with her , Deshawn  History of PCOS, bicornate uterus( recently diagnosis)      Caffeine intake/servings daily - 0  Calcium intake/servings daily - 3  Exercise 5 times weekly - describe ; walks, precautions given  Sunscreen used - Yes  Seatbelts used - Yes  Self Breast Exam - Yes  Pap test up to date -  Yes  Dental exam up to date -  Yes  Immunizations reviewed and up to date - Yes  Abuse: Current or Past (Physical, Sexual or Emotional) - No  Do you feel safe in your environment - Yes    Prenatal OB Questionnaire  Patient supplied answers from flow sheet for:  Prenatal OB Questionnaire.  Past Medical History  Have you ever recieved care for your mental health? : (!) (Patient-Rptd) Yes  Have you ever been in a major accident or suffered serious trauma?: (Patient-Rptd) No  Within the last year, has anyone hit, slapped, kicked or otherwise hurt you?: (Patient-Rptd) No  In the last year, has anyone forced you to have sex when you didn't want to?: (Patient-Rptd) No    Past Medical History 2   Have you ever received a blood transfusion?: (Patient-Rptd) No  Would you accept a blood transfusion if was medically recommended?: (Patient-Rptd) Yes  Does anyone in your home smoke?: (Patient-Rptd) No   Is your blood type Rh negative?: (Patient-Rptd) No  Have you ever ?: (Patient-Rptd) No  Have you been hospitalized for a nonsurgical reason excluding normal delivery?: (Patient-Rptd) No  Have you ever had an abnormal pap smear?: (Patient-Rptd) No    Past Medical History (Continued)  Do you have a history of abnormalities of the uterus?: (!) (Patient-Rptd) Yes  Did your mother take MARK or any other hormones when she was pregnant  with you?: (Patient-Rptd) No  Do you have any other problems we have not asked about which you feel may be important to this pregnancy?: (Patient-Rptd) No                     Allergies as of 5/5/2025:    Allergies as of 05/05/2025 - Reviewed 05/05/2025   Allergen Reaction Noted    Amoxicillin Hives 12/18/2009    Penicillins Hives 03/09/2021       Current medications are:  Current Outpatient Medications   Medication Sig Dispense Refill    Prenatal Vit-Fe Fumarate-FA (PRENATAL MULTIVITAMIN  PLUS IRON) 27-1 MG TABS Take by mouth daily.      VITAMIN D PO Take by mouth.           Early ultrasound screening tool:    Does patient have irregular periods?  No  Did patient use hormonal birth control in the three months prior to positive urine pregnancy test? No  Is the patient breastfeeding?  No  Is the patient 10 weeks or greater at time of education visit?  No

## 2025-05-13 LAB
ABO + RH BLD: NORMAL
BLD GP AB SCN SERPL QL: NEGATIVE
SPECIMEN EXP DATE BLD: NORMAL

## 2025-05-14 ENCOUNTER — PRENATAL OFFICE VISIT (OUTPATIENT)
Dept: OBGYN | Facility: CLINIC | Age: 35
End: 2025-05-14
Payer: COMMERCIAL

## 2025-05-14 ENCOUNTER — TRANSCRIBE ORDERS (OUTPATIENT)
Dept: MATERNAL FETAL MEDICINE | Facility: CLINIC | Age: 35
End: 2025-05-14

## 2025-05-14 ENCOUNTER — ANCILLARY PROCEDURE (OUTPATIENT)
Dept: ULTRASOUND IMAGING | Facility: CLINIC | Age: 35
End: 2025-05-14
Payer: COMMERCIAL

## 2025-05-14 VITALS
HEART RATE: 68 BPM | SYSTOLIC BLOOD PRESSURE: 124 MMHG | WEIGHT: 151.6 LBS | OXYGEN SATURATION: 100 % | BODY MASS INDEX: 26.02 KG/M2 | DIASTOLIC BLOOD PRESSURE: 74 MMHG

## 2025-05-14 DIAGNOSIS — O26.90 PREGNANCY RELATED CONDITION, ANTEPARTUM: Primary | ICD-10-CM

## 2025-05-14 DIAGNOSIS — Z34.01 ENCOUNTER FOR SUPERVISION OF NORMAL FIRST PREGNANCY, FIRST TRIMESTER: Primary | ICD-10-CM

## 2025-05-14 DIAGNOSIS — Z34.00 ENCOUNTER FOR SUPERVISION OF NORMAL FIRST PREGNANCY: ICD-10-CM

## 2025-05-14 LAB
ALBUMIN UR-MCNC: NEGATIVE MG/DL
APPEARANCE UR: CLEAR
BILIRUB UR QL STRIP: NEGATIVE
COLOR UR AUTO: YELLOW
ERYTHROCYTE [DISTWIDTH] IN BLOOD BY AUTOMATED COUNT: 11.2 % (ref 10–15)
EST. AVERAGE GLUCOSE BLD GHB EST-MCNC: 103 MG/DL
GLUCOSE UR STRIP-MCNC: NEGATIVE MG/DL
HBA1C MFR BLD: 5.2 % (ref 0–5.6)
HBV SURFACE AG SERPL QL IA: NONREACTIVE
HCT VFR BLD AUTO: 42.3 % (ref 35–47)
HCV AB SERPL QL IA: NONREACTIVE
HGB BLD-MCNC: 14.6 G/DL (ref 11.7–15.7)
HGB UR QL STRIP: NEGATIVE
KETONES UR STRIP-MCNC: NEGATIVE MG/DL
LEUKOCYTE ESTERASE UR QL STRIP: NEGATIVE
MCH RBC QN AUTO: 32.4 PG (ref 26.5–33)
MCHC RBC AUTO-ENTMCNC: 34.5 G/DL (ref 31.5–36.5)
MCV RBC AUTO: 94 FL (ref 78–100)
NITRATE UR QL: NEGATIVE
PH UR STRIP: 6 [PH] (ref 5–7)
PLATELET # BLD AUTO: 184 10E3/UL (ref 150–450)
RBC # BLD AUTO: 4.5 10E6/UL (ref 3.8–5.2)
RUBV IGG SERPL QL IA: 2.24 INDEX
RUBV IGG SERPL QL IA: POSITIVE
SP GR UR STRIP: <=1.005 (ref 1–1.03)
T PALLIDUM AB SER QL: NONREACTIVE
UROBILINOGEN UR STRIP-ACNC: 0.2 E.U./DL
WBC # BLD AUTO: 6.1 10E3/UL (ref 4–11)

## 2025-05-14 PROCEDURE — 86762 RUBELLA ANTIBODY: CPT

## 2025-05-14 PROCEDURE — 83036 HEMOGLOBIN GLYCOSYLATED A1C: CPT

## 2025-05-14 PROCEDURE — 76817 TRANSVAGINAL US OBSTETRIC: CPT | Performed by: OBSTETRICS & GYNECOLOGY

## 2025-05-14 PROCEDURE — 81003 URINALYSIS AUTO W/O SCOPE: CPT

## 2025-05-14 PROCEDURE — 99213 OFFICE O/P EST LOW 20 MIN: CPT | Mod: 25

## 2025-05-14 PROCEDURE — 86803 HEPATITIS C AB TEST: CPT

## 2025-05-14 PROCEDURE — 0500F INITIAL PRENATAL CARE VISIT: CPT

## 2025-05-14 PROCEDURE — 86901 BLOOD TYPING SEROLOGIC RH(D): CPT

## 2025-05-14 PROCEDURE — 86850 RBC ANTIBODY SCREEN: CPT

## 2025-05-14 PROCEDURE — 84443 ASSAY THYROID STIM HORMONE: CPT

## 2025-05-14 PROCEDURE — 87340 HEPATITIS B SURFACE AG IA: CPT

## 2025-05-14 PROCEDURE — 3074F SYST BP LT 130 MM HG: CPT

## 2025-05-14 PROCEDURE — 3078F DIAST BP <80 MM HG: CPT

## 2025-05-14 PROCEDURE — 85027 COMPLETE CBC AUTOMATED: CPT

## 2025-05-14 PROCEDURE — 36415 COLL VENOUS BLD VENIPUNCTURE: CPT

## 2025-05-14 PROCEDURE — 86780 TREPONEMA PALLIDUM: CPT

## 2025-05-14 PROCEDURE — 87086 URINE CULTURE/COLONY COUNT: CPT

## 2025-05-14 PROCEDURE — 86900 BLOOD TYPING SEROLOGIC ABO: CPT

## 2025-05-14 PROCEDURE — 87389 HIV-1 AG W/HIV-1&-2 AB AG IA: CPT

## 2025-05-14 NOTE — PROGRESS NOTES
SUBJECTIVE:     HPI:    This is a 34 year old female patient,  who presents for her first obstetrical visit.    JÚNIOR: 2025, by Last Menstrual Period.  She is 7w3d weeks.  Her cycles are regular.  Her last menstrual period was normal.   Since her LMP, she has had no complaints).   She denies emesis, vaginal bleeding, and constipation.    Additional History:   -hx pcos  -hx bicornuate uterus vs arcuate uterus       HISTORY:   Planned Pregnancy: Yes  Marital Status:  to Deshawn  Occupation: [employer]  Living in Household: Spouse    Past History:  Her past medical history   Past Medical History:   Diagnosis Date    Bicornuate uterus 2025    diagnosed by ultrasound    Intrinsic eczema     PCOS (polycystic ovarian syndrome) 2025    irregular periods, polycystic appearing ovaries   .      She has a history of  no prior ob hx    Since her last LMP she denies use of alcohol, tobacco and street drugs.    Past medical, surgical, social and family history were reviewed and updated in [facility].        Current Outpatient Medications   Medication Sig Dispense Refill    Prenatal Vit-Fe Fumarate-FA (PRENATAL MULTIVITAMIN  PLUS IRON) 27-1 MG TABS Take by mouth daily.      VITAMIN D PO Take by mouth.       No current facility-administered medications for this visit.       ROS:   12 point review of systems negative other than symptoms noted below or in the HPI.      OBJECTIVE:     EXAM:  /74 (BP Location: Left arm, Patient Position: Sitting, Cuff Size: Adult Regular)   Pulse 68   Wt 68.8 kg (151 lb 9.6 oz)   LMP 2025   SpO2 100%   BMI 26.02 kg/m   Body mass index is 26.02 kg/m .    GENERAL: alert and no distress  EYES: Eyes grossly normal to inspection, PERRL and conjunctivae and sclerae normal  NECK: no adenopathy, no asymmetry, masses, or scars  RESP: RRR  CV: regular rate and rhythm,  no peripheral edema  ABDOMEN: soft, nontender, no hepatosplenomegaly, no masses and bowel sounds  normal  MS: no gross musculoskeletal defects noted, no edema  SKIN: no suspicious lesions or rashes  NEURO: Normal strength and tone, mentation intact and speech normal  PSYCH: mentation appears normal, affect normal/bright    ASSESSMENT/PLAN:       ICD-10-CM    1. Encounter for supervision of normal first pregnancy, first trimester  Z34.01 ABO/Rh type and screen     Hepatitis B surface antigen     CBC with platelets     HIV Antigen Antibody Combo     Rubella Antibody IgG     Treponema Abs w Reflex to RPR and Titer     Urine Culture Aerobic Bacterial     Hemoglobin A1c     Hepatitis C antibody     UA without Microscopic - lab collect     TSH with free T4 reflex     Mat Fetal Med Ctr Referral - Pregnancy     US OB > 14 Weeks          34 year old , 7w3d weeks of pregnancy with JÚNIOR of 2025, by Last Menstrual Period    Discussed as follows:  -nob labs today  -pap utd  -gc referral placed  -fas 18-20 ordered  -flu covid tdap rsv recc in pregnancy  -md vs cnm team  -delivery at RD locations of pnc  -rn triage vs mychart    Counseling given:   - Follow up in 4-6 weeks for return OB visit.  - Recommended weight gain for pregnancy: 25-35 lbs.         PLAN/PATIENT INSTRUCTIONS:    Rtc 4 wks     ADAM Jiang CNP

## 2025-05-15 ENCOUNTER — RESULTS FOLLOW-UP (OUTPATIENT)
Dept: OBGYN | Facility: CLINIC | Age: 35
End: 2025-05-15

## 2025-05-15 LAB
BACTERIA UR CULT: NORMAL
HIV 1+2 AB+HIV1 P24 AG SERPL QL IA: NONREACTIVE
TSH SERPL DL<=0.005 MIU/L-ACNC: 1.62 UIU/ML (ref 0.3–4.2)

## 2025-06-09 ENCOUNTER — PRE VISIT (OUTPATIENT)
Dept: MATERNAL FETAL MEDICINE | Facility: CLINIC | Age: 35
End: 2025-06-09
Payer: COMMERCIAL

## 2025-06-11 NOTE — PROGRESS NOTES
[facility]  Genetic Counseling Consult    Patient:  Ivy Arriaga  Preferred Name: Ivy YOB: 1990   Date of Service:  25   MRN: [id number]    Ivy was seen at the [facility] for genetic consultation. The indication for genetic counseling is desire to discuss options for genetic screening and diagnostics. The patient was accompanied to this visit by their partner, Deshawn.    The session was conducted in English.      IMPRESSION/ PLAN   1. Ivy has not had genetic screening in this pregnancy but elected to have screening today.     2. During today's [facility] visit, Ivy had a blood draw for non-invasive prenatal testing (also called NIPT, NIPS, or cell-free DNA) through [facility]). This NIPT screens for trisomy 21, 18, and 13 and the patient opted to screen for sex chromosome aneuploidies, including reported fetal sex. Results are expected in 7-10 days. The patient will be called with results and if they do not answer they requested a detailed message with results on their voicemail, including the predicted fetal sex information.  Patient was informed that results, including fetal sex, will be available in [place].    3. Since the patient chose aneuploidy screening via NIPT, quad screen is NOT recommended in the second trimester. If the patient desires screening for open neural tube defects, maternal serum AFP only is recommended, ideally between 16-18 weeks gestation.    4. Ivy had a nuchal translucency ultrasound today. Please see the ultrasound report for further details.    5. Further recommendations include a fetal anatomy level II ultrasound with [facility]. The upcoming ultrasound has been scheduled for 2025.    PREGNANCY HISTORY   /Parity:       This is Ivy's first pregnancy.    CURRENT PREGNANCY   Current Age: 34 year old   Age at Delivery: 35 year old  JÚNIOR:  "12/28/2025, by Last Menstrual Period                                   Gestational Age: 11w3d  This pregnancy is a single gestation.   This pregnancy was conceived spontaneously.    MEDICAL HISTORY   Ivy s reported medical history is not expected to impact pregnancy management or risks to fetal development.       FAMILY HISTORY   A three-generation pedigree was obtained today and is scanned under the \"Media\" tab in Epic. The family history was reported by Ivy and their partner.    There were no significant findings reported today. Ivy's partner, Deshawn, is currently 33 years old and healthy.      Otherwise, the reported family history is unremarkable for multiple miscarriages, stillbirths, birth defects, intellectual disabilities, known genetic conditions, and consanguinity.       RISK ASSESSMENT FOR INHERITED CONDITIONS AND CARRIER SCREENING OPTIONS   Expanded carrier screening is available to screen for autosomal recessive conditions and X-linked conditions in a large list of genes. Carrier screening does not test the pregnancy but gives a risk assessment for the pregnancy and future pregnancies to have the condition. Expanded carrier screening is designed to identify carrier status for conditions that are primarily childhood or adolescent onset. Expanded carrier screening does not evaluate for adult-onset conditions such as hereditary cancer syndromes, dementia/ Alzheimer's disease, or cardiovascular disease risk factors. Additionally, expanded carrier screening is not comprehensive for all known genetic diseases or inherited conditions. Carrier screening does not test for all genetic and health conditions or risk factors.     Autosomal recessive conditions happen when a mutation has been inherited from the egg and sperm and include conditions like cystic fibrosis, thalassemia, hearing loss, spinal muscular atrophy, and more. We reviewed that when both biological parents carry a harmful genetic change " in a gene associated with autosomal recessive inheritance, each of their pregnancies has a 1 in 4 (25%) chance to be affected by that condition. X-linked conditions happen when a mutation has been inherited from the egg and include conditions like fragile X syndrome.With x-linked conditions, the specific risk generally depends on the chromosomal sex of the fetus, with XY individuals (generally male) being most severely affected.      screening was reviewed. About [place] Scre[facility]    The patient does NOT have a family history of known inherited conditions. This does NOT mean the patient and/or their partner is not a carrier of a condition. Approximately 90% of couples at an increased reproductive risk for an inherited condition have no family history of that condition.     The patient has not had carrier screening previously.     The patient declined the carrier screening options. They are aware the option will remain, and they can contact us if they would like to pursue screening. See below for the more detailed information we dicussed.  Carrier screening does not test the pregnancy but gives a risk assessment for the pregnancy and future pregnancies to have the condition. The only way to determine with absolutely certainty whether if a pregnancy is affected with a  genetic condition is through diagnostic testing such as a chorionic villus sampling or amniocentesis. Other options would include testing baby after delivery.   There are different size panels or list of conditions for carrier screening.   Some conditions cause health problems for carriers. We discussed that in the event of an incidental finding, further evaluation regarding screening or further testing may be recommended.   Carrier screening does not test for all genetic and health conditions or risk factors  There are limitations to current technology and results may be updated at a later date  The results typically take 2-3 weeks.    RISK  ASSESSMENT FOR CHROMOSOME CONDITIONS   We explained that the risk for fetal chromosome abnormalities increases with maternal age. We discussed specific features of common chromosome abnormalities, including trisomy 21 (Down syndrome), trisomy 13, trisomy 18, and sex chromosome trisomies.    At age 35 at midtrimester, the risk to have a baby with Down syndrome is 1 in 342.   At age 35 at midtrimester, the risk to have a baby with any chromosome abnormality is 1 in  172.   At age 35 at delivery, the risk to have a baby with Down syndrome is 1 in 500.   At age 35 at delivery, the risk to have a baby with any chromosome abnormality is 1 in 253.     Ivy has not had genetic screening in this pregnancy but elected to have screening today.      GENETIC TESTING OPTIONS   Genetic testing during a pregnancy includes screening and diagnostic procedures.      Screening tests are non-invasive which means no risk to the pregnancy and includes ultrasounds and blood work. The benefits and limitations of screening were reviewed. Screening tests provide a risk assessment (chance) specific to the pregnancy for certain fetal chromosome abnormalities but cannot definitively diagnose or exclude a fetal chromosome abnormality. Follow-up genetic counseling and consideration of diagnostic testing is recommended with any abnormal screening result. Diagnostic testing during a pregnancy is more certain and can test for more conditions. However, the tests do have a risk of miscarriage that requires careful consideration. These tests can detect fetal chromosome abnormalities with greater than 99% certainty. Results can be compromised by maternal cell contamination or mosaicism and are limited by the resolution of current genetic testing technology.     There is no screening or diagnostic test that detects all forms of birth defects or intellectual disability.     We discussed the following screening options:     Non-invasive prenatal testing  (NIPT)  Also called cell-free DNA screening because it detects chromosomes from the placenta in the pregnant person's blood  Can be done any time after 10 weeks gestation  Standard recommendation for NIPT screens for trisomy 21, trisomy 18, trisomy 13, with the option of adding sex chromosome aneuploidies, without or without predicted sex  Cannot screen for open neural tube defects, maternal serum AFP after 15 weeks is recommended  New NIPT options include screening for other trisomies, microdeletion syndromes, and in some cases fetal blood antigens. Guidelines do not recommend these conditions are included in standard screening. These options have limitations and should be discussed with a genetic counselor.   However, current (2023) ACMG guidelines do recommend that screening for one microdeletion syndrome, called 22q11.2 deletion syndrome be offered to all pregnant patients. 22q11.2 deletion syndrome has an estimated prevalence of 1 in 990 to 1 in 2148 (0.05-0.1%). Risk is not thought to increase with maternal age. Clinical features are variable but include congenital heart defects, cleft palate, developmental delays, immune system deficiencies, and hearing loss. Approximately 90% of cases are de sudheer (a sporadic new change in a pregnancy). Cell-free DNA screening for 22q11.2 deletion syndrome is available with the inclusion of other microdeletion syndromes. There is less data about the performance of cell-free DNA screening for more rare microdeletions and the chance for false positives or negative may be increased.  We discussed the limitations of cell-free DNA screening in detecting microdeletions and the possibility of false positives and false negatives. The patient declined microdeletion syndrome screening.    We discussed the following ultrasound options:    Nuchal translucency (NT) ultrasound  Ultrasound between [id number] that includes nuchal translucency measurement and nasal bone assessments  Nuchal  translucency refers to the space at the back of the neck where fluid builds up. All babies at this stage have fluid and there is only concern if there is too much fluid  Nasal bone refers to the small bone in the nose. There is concern for conditions like Down syndrome if the bone cannot be seen at all  This ultrasound can be done as part of first trimester screening, at the same time as another screen (NIPT), at the same time as a CVS, or if the patients does not want genetic screening.  Markers on ultrasound detects about 70% of pregnancies with aneuploidy  Abnormalities on NT ultrasound can also increase the risk for a birth defect, like a heart defect    Comprehensive level II ultrasound (Fetal Anatomy Ultrasound)  Ultrasound done between 18-20 weeks gestation  Screens for major birth defects and markers for aneuploidy (like trisomy 21 and trisomy 18)  Includes looking at the fetus/baby's growth, heart, organs (stomach, kidneys), placenta, and amniotic fluid    We discussed the following diagnostic options:     Chorionic villus sampling (CVS)  Invasive diagnostic procedure done between 10w0d and 13w6d  The procedure collects a small sample from the placenta for the purpose of chromosomal testing and/or other genetic testing  Diagnostic result; more than 99% sensitivity for fetal chromosome abnormalities  Cannot screen for open neural tube defects, maternal serum AFP after 15 weeks is recommended    Amniocentesis  Invasive diagnostic procedure done after 15 weeks gestation  The procedure collects a small sample of amniotic fluid for the purpose of chromosomal testing and/or other genetic testing  Diagnostic result; more than 99% sensitivity for fetal chromosome abnormalities  Testing for AFP in the amniotic fluid can test for open neural tube defects    It was a pleasure to be involved with Ivy s care. I spent 60 minutes on the date of the encounter doing chart review, obtaining history, test coordination,  documentation, and further activities as noted above.    Liz Mcgovern, GC, MS, [facility]  Board Certified and [place] Licensed Genetic Counselor  [facility]  Maternal Fetal Medicine  Office: [phone]  [facility]: [phone]   Fax: [phone]  [facility]

## 2025-06-12 ENCOUNTER — OFFICE VISIT (OUTPATIENT)
Dept: MATERNAL FETAL MEDICINE | Facility: CLINIC | Age: 35
End: 2025-06-12
Attending: OBSTETRICS & GYNECOLOGY
Payer: COMMERCIAL

## 2025-06-12 ENCOUNTER — LAB (OUTPATIENT)
Dept: LAB | Facility: CLINIC | Age: 35
End: 2025-06-12
Attending: OBSTETRICS & GYNECOLOGY
Payer: COMMERCIAL

## 2025-06-12 ENCOUNTER — RESULTS FOLLOW-UP (OUTPATIENT)
Dept: OBGYN | Facility: CLINIC | Age: 35
End: 2025-06-12

## 2025-06-12 ENCOUNTER — PRENATAL OFFICE VISIT (OUTPATIENT)
Dept: OBGYN | Facility: CLINIC | Age: 35
End: 2025-06-12
Payer: COMMERCIAL

## 2025-06-12 VITALS
OXYGEN SATURATION: 100 % | BODY MASS INDEX: 26.47 KG/M2 | WEIGHT: 154.2 LBS | HEART RATE: 92 BPM | DIASTOLIC BLOOD PRESSURE: 76 MMHG | SYSTOLIC BLOOD PRESSURE: 140 MMHG

## 2025-06-12 DIAGNOSIS — Z34.01 SUPERVISION OF NORMAL FIRST PREGNANCY IN FIRST TRIMESTER: Primary | ICD-10-CM

## 2025-06-12 DIAGNOSIS — Z34.01 SUPERVISION OF NORMAL FIRST PREGNANCY IN FIRST TRIMESTER: ICD-10-CM

## 2025-06-12 DIAGNOSIS — Z36.9 ENCOUNTER FOR ANTENATAL SCREENING OF MOTHER: ICD-10-CM

## 2025-06-12 DIAGNOSIS — Z34.01 ENCOUNTER FOR SUPERVISION OF NORMAL FIRST PREGNANCY, FIRST TRIMESTER: Primary | ICD-10-CM

## 2025-06-12 DIAGNOSIS — Z36.82 NUCHAL TRANSLUCENCY OF FETUS ON PRENATAL ULTRASOUND: ICD-10-CM

## 2025-06-12 DIAGNOSIS — Q51.3 BICORNATE UTERUS: Primary | ICD-10-CM

## 2025-06-12 DIAGNOSIS — O26.90 PREGNANCY RELATED CONDITION, ANTEPARTUM: ICD-10-CM

## 2025-06-12 PROCEDURE — 36415 COLL VENOUS BLD VENIPUNCTURE: CPT

## 2025-06-12 NOTE — PROGRESS NOTES
Please see full imaging report from [facility] program under imaging tab.    Scheduled for anatomic survey with [facility] for cervical length given prior imaging indicating bicornuate uterus    Aquilino Rangel MD  Maternal Fetal Medicine

## 2025-06-12 NOTE — PROGRESS NOTES
11.4 wks here for Jesse  Feeling well some mild nausea worse in evenings   Had spotting around 8 wks light self limited no concerns since  GC testing at [facility] today plan NT/CFDNA today  FAS schedule 18-20 wks  Otherwise doing well, activity discussed   No concerns  Rtc 4 wks  RN triage for any needs  ADAM Jiang CNP

## 2025-06-19 ENCOUNTER — TELEPHONE (OUTPATIENT)
Dept: MATERNAL FETAL MEDICINE | Facility: CLINIC | Age: 35
End: 2025-06-19
Payer: COMMERCIAL

## 2025-06-19 LAB — SCANNED LAB RESULT: NORMAL

## 2025-07-01 ENCOUNTER — MYC MEDICAL ADVICE (OUTPATIENT)
Dept: OBGYN | Facility: CLINIC | Age: 35
End: 2025-07-01
Payer: COMMERCIAL

## 2025-07-10 ENCOUNTER — PRENATAL OFFICE VISIT (OUTPATIENT)
Dept: OBGYN | Facility: CLINIC | Age: 35
End: 2025-07-10
Payer: COMMERCIAL

## 2025-07-10 VITALS
OXYGEN SATURATION: 100 % | SYSTOLIC BLOOD PRESSURE: 122 MMHG | HEART RATE: 72 BPM | BODY MASS INDEX: 27.1 KG/M2 | WEIGHT: 157.9 LBS | DIASTOLIC BLOOD PRESSURE: 71 MMHG

## 2025-07-10 DIAGNOSIS — Z34.82 ENCOUNTER FOR SUPERVISION OF OTHER NORMAL PREGNANCY, SECOND TRIMESTER: Primary | ICD-10-CM

## 2025-07-10 NOTE — PROGRESS NOTES
15.4 wks here for LEO  +fm   Denies cramping, ctx, bleeding or LOF  Had a headache the other day that was intermittent - can try mag ox  Less nausea but did have emesis yesterday  Had gc NIPT LR expecting a girl!  offered MSAFP she will consider optimal window 15-20  Feeling improved overall- activity addressed   Rtc 4 wks  RN triage for any needs  ADAM Jiang CNP

## 2025-07-17 ENCOUNTER — MYC MEDICAL ADVICE (OUTPATIENT)
Dept: OBGYN | Facility: CLINIC | Age: 35
End: 2025-07-17
Payer: COMMERCIAL

## 2025-08-05 ENCOUNTER — OFFICE VISIT (OUTPATIENT)
Dept: MATERNAL FETAL MEDICINE | Facility: CLINIC | Age: 35
End: 2025-08-05
Attending: OBSTETRICS & GYNECOLOGY
Payer: COMMERCIAL

## 2025-08-05 ENCOUNTER — PRENATAL OFFICE VISIT (OUTPATIENT)
Dept: OBGYN | Facility: CLINIC | Age: 35
End: 2025-08-05
Payer: COMMERCIAL

## 2025-08-05 ENCOUNTER — HOSPITAL ENCOUNTER (OUTPATIENT)
Dept: ULTRASOUND IMAGING | Facility: CLINIC | Age: 35
Discharge: HOME | End: 2025-08-05
Attending: OBSTETRICS & GYNECOLOGY
Payer: COMMERCIAL

## 2025-08-05 VITALS
BODY MASS INDEX: 27.45 KG/M2 | SYSTOLIC BLOOD PRESSURE: 130 MMHG | DIASTOLIC BLOOD PRESSURE: 69 MMHG | WEIGHT: 159.9 LBS | HEART RATE: 71 BPM | OXYGEN SATURATION: 100 %

## 2025-08-05 DIAGNOSIS — O34.02 UTERINE CONGENITAL ANOMALY IN PREGNANCY, SECOND TRIMESTER: Primary | ICD-10-CM

## 2025-08-05 DIAGNOSIS — O44.40 LOW LYING PLACENTA NOS OR WITHOUT HEMORRHAGE, UNSPECIFIED TRIMESTER: ICD-10-CM

## 2025-08-05 DIAGNOSIS — Q51.3 BICORNATE UTERUS: ICD-10-CM

## 2025-08-05 DIAGNOSIS — Z34.02 FIRST PREGNANCY, SECOND TRIMESTER: Primary | ICD-10-CM

## 2025-08-05 PROCEDURE — 3075F SYST BP GE 130 - 139MM HG: CPT

## 2025-08-05 PROCEDURE — 76811 OB US DETAILED SNGL FETUS: CPT

## 2025-08-05 PROCEDURE — 3078F DIAST BP <80 MM HG: CPT

## 2025-08-05 PROCEDURE — 99207 PR PRENATAL VISIT: CPT

## 2025-08-05 PROCEDURE — 0502F SUBSEQUENT PRENATAL CARE: CPT
